# Patient Record
Sex: MALE | Race: WHITE | NOT HISPANIC OR LATINO | URBAN - METROPOLITAN AREA
[De-identification: names, ages, dates, MRNs, and addresses within clinical notes are randomized per-mention and may not be internally consistent; named-entity substitution may affect disease eponyms.]

---

## 2019-01-20 ENCOUNTER — INPATIENT (INPATIENT)
Facility: HOSPITAL | Age: 49
LOS: 1 days | Discharge: HOME | End: 2019-01-22
Attending: SURGERY | Admitting: SURGERY
Payer: COMMERCIAL

## 2019-01-20 VITALS
RESPIRATION RATE: 20 BRPM | HEART RATE: 84 BPM | SYSTOLIC BLOOD PRESSURE: 141 MMHG | OXYGEN SATURATION: 100 % | DIASTOLIC BLOOD PRESSURE: 93 MMHG

## 2019-01-20 LAB
ALBUMIN SERPL ELPH-MCNC: 4.6 G/DL — SIGNIFICANT CHANGE UP (ref 3.5–5.2)
ALP SERPL-CCNC: 68 U/L — SIGNIFICANT CHANGE UP (ref 30–115)
ALT FLD-CCNC: 104 U/L — HIGH (ref 0–41)
ANION GAP SERPL CALC-SCNC: 21 MMOL/L — HIGH (ref 7–14)
APTT BLD: 29.3 SEC — SIGNIFICANT CHANGE UP (ref 27–39.2)
AST SERPL-CCNC: 203 U/L — HIGH (ref 0–41)
BASOPHILS # BLD AUTO: 0.12 K/UL — SIGNIFICANT CHANGE UP (ref 0–0.2)
BASOPHILS NFR BLD AUTO: 0.9 % — SIGNIFICANT CHANGE UP (ref 0–1)
BILIRUB SERPL-MCNC: 0.3 MG/DL — SIGNIFICANT CHANGE UP (ref 0.2–1.2)
BLASTS # FLD: 0.9 % — HIGH (ref 0–0)
BLD GP AB SCN SERPL QL: SIGNIFICANT CHANGE UP
BUN SERPL-MCNC: 9 MG/DL — LOW (ref 10–20)
CALCIUM SERPL-MCNC: 9.1 MG/DL — SIGNIFICANT CHANGE UP (ref 8.5–10.1)
CHLORIDE SERPL-SCNC: 100 MMOL/L — SIGNIFICANT CHANGE UP (ref 98–110)
CO2 SERPL-SCNC: 25 MMOL/L — SIGNIFICANT CHANGE UP (ref 17–32)
CREAT SERPL-MCNC: 1 MG/DL — SIGNIFICANT CHANGE UP (ref 0.7–1.5)
EOSINOPHIL # BLD AUTO: 0.33 K/UL — SIGNIFICANT CHANGE UP (ref 0–0.7)
EOSINOPHIL NFR BLD AUTO: 2.6 % — SIGNIFICANT CHANGE UP (ref 0–8)
ETHANOL SERPL-MCNC: 338 MG/DL — HIGH
GIANT PLATELETS BLD QL SMEAR: PRESENT — SIGNIFICANT CHANGE UP
GLUCOSE SERPL-MCNC: 110 MG/DL — HIGH (ref 70–99)
HCT VFR BLD CALC: 47.4 % — SIGNIFICANT CHANGE UP (ref 42–52)
HGB BLD-MCNC: 15.7 G/DL — SIGNIFICANT CHANGE UP (ref 14–18)
INR BLD: 0.89 RATIO — SIGNIFICANT CHANGE UP (ref 0.65–1.3)
LIDOCAIN IGE QN: 33 U/L — SIGNIFICANT CHANGE UP (ref 7–60)
LYMPHOCYTES # BLD AUTO: 27.4 % — SIGNIFICANT CHANGE UP (ref 20.5–51.1)
LYMPHOCYTES # BLD AUTO: 3.51 K/UL — HIGH (ref 1.2–3.4)
MANUAL SMEAR VERIFICATION: SIGNIFICANT CHANGE UP
MCHC RBC-ENTMCNC: 29.9 PG — SIGNIFICANT CHANGE UP (ref 27–31)
MCHC RBC-ENTMCNC: 33.1 G/DL — SIGNIFICANT CHANGE UP (ref 32–37)
MCV RBC AUTO: 90.3 FL — SIGNIFICANT CHANGE UP (ref 80–94)
METAMYELOCYTES # FLD: 0.9 % — HIGH (ref 0–0)
MONOCYTES # BLD AUTO: 0.68 K/UL — HIGH (ref 0.1–0.6)
MONOCYTES NFR BLD AUTO: 5.3 % — SIGNIFICANT CHANGE UP (ref 1.7–9.3)
NEUTROPHILS # BLD AUTO: 6.24 K/UL — SIGNIFICANT CHANGE UP (ref 1.4–6.5)
NEUTROPHILS NFR BLD AUTO: 48.7 % — SIGNIFICANT CHANGE UP (ref 42.2–75.2)
NRBC # BLD: 0 /100 WBCS — SIGNIFICANT CHANGE UP (ref 0–0)
PLAT MORPH BLD: NORMAL — SIGNIFICANT CHANGE UP
PLATELET # BLD AUTO: 217 K/UL — SIGNIFICANT CHANGE UP (ref 130–400)
POTASSIUM SERPL-MCNC: 3.9 MMOL/L — SIGNIFICANT CHANGE UP (ref 3.5–5)
POTASSIUM SERPL-SCNC: 3.9 MMOL/L — SIGNIFICANT CHANGE UP (ref 3.5–5)
PROT SERPL-MCNC: 7.8 G/DL — SIGNIFICANT CHANGE UP (ref 6–8)
PROTHROM AB SERPL-ACNC: 10.2 SEC — SIGNIFICANT CHANGE UP (ref 9.95–12.87)
RBC # BLD: 5.25 M/UL — SIGNIFICANT CHANGE UP (ref 4.7–6.1)
RBC # FLD: 12.4 % — SIGNIFICANT CHANGE UP (ref 11.5–14.5)
RBC BLD AUTO: NORMAL — SIGNIFICANT CHANGE UP
SODIUM SERPL-SCNC: 146 MMOL/L — SIGNIFICANT CHANGE UP (ref 135–146)
TYPE + AB SCN PNL BLD: SIGNIFICANT CHANGE UP
VARIANT LYMPHS # BLD: 13.3 % — HIGH (ref 0–5)
WBC # BLD: 12.82 K/UL — HIGH (ref 4.8–10.8)
WBC # FLD AUTO: 12.82 K/UL — HIGH (ref 4.8–10.8)

## 2019-01-20 RX ORDER — SODIUM CHLORIDE 9 MG/ML
2000 INJECTION, SOLUTION INTRAVENOUS ONCE
Qty: 0 | Refills: 0 | Status: COMPLETED | OUTPATIENT
Start: 2019-01-20 | End: 2019-01-20

## 2019-01-20 RX ORDER — TETANUS TOXOID, REDUCED DIPHTHERIA TOXOID AND ACELLULAR PERTUSSIS VACCINE, ADSORBED 5; 2.5; 8; 8; 2.5 [IU]/.5ML; [IU]/.5ML; UG/.5ML; UG/.5ML; UG/.5ML
0.5 SUSPENSION INTRAMUSCULAR ONCE
Qty: 0 | Refills: 0 | Status: COMPLETED | OUTPATIENT
Start: 2019-01-20 | End: 2019-01-20

## 2019-01-20 RX ORDER — THIAMINE MONONITRATE (VIT B1) 100 MG
100 TABLET ORAL ONCE
Qty: 0 | Refills: 0 | Status: COMPLETED | OUTPATIENT
Start: 2019-01-20 | End: 2019-01-20

## 2019-01-20 RX ADMIN — SODIUM CHLORIDE 1000 MILLILITER(S): 9 INJECTION, SOLUTION INTRAVENOUS at 22:45

## 2019-01-20 NOTE — ED PROVIDER NOTE - NS ED ATTENDING STATEMENT MOD
I have personally seen and examined this patient.  I have fully participated in the care of this patient. I have reviewed all pertinent clinical information, including history, physical exam, plan and the Resident’s note and agree except as noted. yes

## 2019-01-20 NOTE — ED ADULT NURSE NOTE - OBJECTIVE STATEMENT
49 y/o male BIBEMS after being found at the bottom of stairs with head trauma. GCS 14, unknown LOC, +AOB, and lac to R temple

## 2019-01-20 NOTE — ED PROVIDER NOTE - SECONDARY DIAGNOSIS.
Multiple rib fractures Fall (on) (from) unspecified stairs and steps, initial encounter Alcohol intoxication

## 2019-01-20 NOTE — ED PROVIDER NOTE - ATTENDING CONTRIBUTION TO CARE
intxociated patient not on AC who fell down 7 steps while intoxicated unclear if LOC but there is head injury. on exam primary survey itnact, 2nd survey shows left thigh bruising but non tender, right lateral orbit lac, no spinal tendnerss, soft abd, chest nml, ext nml. imp: head injury while intoxicated, will get cxr, pelvis, ct c spine/head and max facial, suture lac. update tdap.

## 2019-01-20 NOTE — ED PROVIDER NOTE - PHYSICAL EXAMINATION
GEN: In no apparent distress. Intoxicated.    HEAD:  Normocephalic, atraumatic.    EYES:  Clear conjunctivae without injection, drainage or discharge.    ENMT:  Dry MM.    NECK:  Supple, no masses. Normal ROM.    CARDIAC:  RRR, normal S1 and S2, no murmurs, rubs or gallops.    RESP:  Respiratory rate and effort appear normal; lungs are clear to auscultation bilaterally; no rhonchi, rales or wheezes.    ABDOMEN:  Soft, non-tender, non-distended, no masses. Normal BS throughout.    MUSCULOSKELETAL: No leg swelling, no calf tenderness. Patient is moving all extremities spontaneously.  NEURO:  AAO x 2 Keeps saying its 1999 or 1799. Motor 5/5. Follows commands. PERRL. (+) small laceration to right outer orbit with swelling around right orbit.    SKIN:  Normal skin color for age and race, well-perfused; warm and dry.

## 2019-01-20 NOTE — ED PROVIDER NOTE - CARE PLAN
Principal Discharge DX:	Subarachnoid bleed  Secondary Diagnosis:	Multiple rib fractures  Secondary Diagnosis:	Fall (on) (from) unspecified stairs and steps, initial encounter  Secondary Diagnosis:	Alcohol intoxication

## 2019-01-20 NOTE — ED PROVIDER NOTE - PROGRESS NOTE DETAILS
Spoke with Andie from subspeciality for hemorrhage will come assess patient As per Dr. Price admit to SICU under Dr. Smith

## 2019-01-20 NOTE — ED ADULT NURSE NOTE - CHIEF COMPLAINT QUOTE
BIBEMS with head trauma s/p being found at bottom of stairs. GCS 14, lac to lateral side of face, +AOB

## 2019-01-20 NOTE — ED PROVIDER NOTE - MEDICAL DECISION MAKING DETAILS
pt evaluated for trauma after intox and fall, found ot have multiple rib fracture and possibel SAH. staretd on keppra, admitted to sicu.

## 2019-01-20 NOTE — ED PROVIDER NOTE - OBJECTIVE STATEMENT
49 yo male with no significant PMHx presents for fall with intoxication. EMS states patient went to bar as per family and drove home and was walking up stairs and fell down 7 stairs with (+) head trauma (-) LOC as witnessed by daughter. Patient is AAO x 2 does not know year and cannot recall any events of tonight. Patient denies fevers, chest pain, SOB, abdominal pain, nausea, vomiting, diarrhea, dizziness, weakness, changes in PO intake, changes in urine output.

## 2019-01-20 NOTE — CONSULT NOTE ADULT - ASSESSMENT
ASSESSMENT: Patient is a 48M s/p fall down 7 stairs, +HT, -LOC, -AC    PLAN:    - panscan  - will follow  -   -

## 2019-01-20 NOTE — CONSULT NOTE ADULT - SUBJECTIVE AND OBJECTIVE BOX
TRAUMA SERVICE  CONSULT NOTE  --------------------------------------------------------------------------------------------    TRAUMA ACTIVATION LEVEL:     MECHANISM OF INJURY:      [X] Blunt  	[] MVC	[X] Fall	[] Pedestrian Struck	[] Motorcycle accident      [] Penetrating  	[] Gun Shot Wound 		[] Stab Wound    GCS: 	E: 4	V: 4	M: 6    Patient is a 48M PMH asthma who presents s/p witnessed fall down 7 steps. Per witnesses, patient had driven home from the bar, attempted to climb the stairs to his house when he fell backwards down 7 stairs as witnessed by his daughter. He is unable to recall the events surrounding his presentation to the ED. He only recalls going to the doctor with his wife then presenting to the ED. He denies history of frequent alcohol use. Patient is clearly still intoxicated; history and ROS is limited accordingly. His only complaint regards his L PIV.    Primary Survey:    A - airway intact  B - bilateral breath sounds and good chest rise  C - initial BP  BP: 141/93 (01-20-19 @ 21:57) *** , HR HR: 84 (01-20-19 @ 21:57), palpable pulses in all extremities  D - GCS 14on arrival  Exposure obtained      Secondary Survey:   General: NAD  HEENT: Normocephalic, R periorbial edema, ecchymosis, small lateral aspect of right orbit laceration, EOMI, PEERLA.  Neck: Soft, midline trachea.  Chest: No chest wall tenderness.   Cardiac: S1, S2, RRR  Respiratory: Bilateral breath sounds, clear and equal bilaterally  Abdomen: Soft, non-distended, non-tender, no rebound, no guarding, no masses palpated  Ext: palp radial b/l UE, b/l DP palp in Lower Extrem, motor and sensory grossly intact in all 4 extremities  Back: no TTP, no palpable runoff/stepoff/deformity    ROS limited d/t patient intoxication    ROS: 10-system review is otherwise negative except HPI above.      PAST MEDICAL & SURGICAL HISTORY:  PTSD (post-traumatic stress disorder), asthma    FAMILY HISTORY:    [x] Family history not pertinent as reviewed with the patient and family    SOCIAL HISTORY:  not pertinent  ALLERGIES: No Known Allergies      HOME MEDICATIONS: albuterol    CURRENT MEDICATIONS  MEDICATIONS (STANDING):   MEDICATIONS (PRN):  --------------------------------------------------------------------------------------------    Vitals:   T(C): --  HR: 84 (01-20-19 @ 21:57) (84 - 84)  BP: 141/93 (01-20-19 @ 21:57) (141/93 - 141/93)  RR: 20 (01-20-19 @ 21:57) (20 - 20)  SpO2: 100% (01-20-19 @ 21:57) (100% - 100%)  CAPILLARY BLOOD GLUCOSE      POCT Blood Glucose.: 110 mg/dL (20 Jan 2019 21:56)    CAPILLARY BLOOD GLUCOSE      POCT Blood Glucose.: 110 mg/dL (20 Jan 2019 21:56)        --------------------------------------------------------------------------------------------    LABS  CBC (01-20 @ 22:00)                              15.7                           12.82<H>  )----------------(  217        --    % Neutrophils, --    % Lymphocytes, ANC: --                                  47.4        --------------------------------------------------------------------------------------------    IMAGING  PENDING    --------------------------------------------------------------------------------------------

## 2019-01-20 NOTE — ED ADULT NURSE NOTE - NSIMPLEMENTINTERV_GEN_ALL_ED
Implemented All Fall with Harm Risk Interventions:  [place] to call system. Call bell, personal items and telephone within reach. Instruct patient to call for assistance. Room bathroom lighting operational. Non-slip footwear when patient is off stretcher. Physically safe environment: no spills, clutter or unnecessary equipment. Stretcher in lowest position, wheels locked, appropriate side rails in place. Provide visual cue, wrist band, yellow gown, etc. Monitor gait and stability. Monitor for mental status changes and reorient to person, place, and time. Review medications for side effects contributing to fall risk. Reinforce activity limits and safety measures with patient and family. Provide visual clues: red socks.

## 2019-01-21 ENCOUNTER — TRANSCRIPTION ENCOUNTER (OUTPATIENT)
Age: 49
End: 2019-01-21

## 2019-01-21 LAB
ALBUMIN SERPL ELPH-MCNC: 4 G/DL — SIGNIFICANT CHANGE UP (ref 3.5–5.2)
ALP SERPL-CCNC: 64 U/L — SIGNIFICANT CHANGE UP (ref 30–115)
ALT FLD-CCNC: 96 U/L — HIGH (ref 0–41)
AMPHET UR-MCNC: NEGATIVE — SIGNIFICANT CHANGE UP
ANION GAP SERPL CALC-SCNC: 19 MMOL/L — HIGH (ref 7–14)
APPEARANCE UR: CLEAR — SIGNIFICANT CHANGE UP
AST SERPL-CCNC: 150 U/L — HIGH (ref 0–41)
BARBITURATES UR SCN-MCNC: NEGATIVE — SIGNIFICANT CHANGE UP
BASOPHILS # BLD AUTO: 0.03 K/UL — SIGNIFICANT CHANGE UP (ref 0–0.2)
BASOPHILS NFR BLD AUTO: 0.2 % — SIGNIFICANT CHANGE UP (ref 0–1)
BENZODIAZ UR-MCNC: NEGATIVE — SIGNIFICANT CHANGE UP
BILIRUB DIRECT SERPL-MCNC: <0.2 MG/DL — SIGNIFICANT CHANGE UP (ref 0–0.2)
BILIRUB INDIRECT FLD-MCNC: >0.4 MG/DL — SIGNIFICANT CHANGE UP (ref 0.2–1.2)
BILIRUB SERPL-MCNC: 0.6 MG/DL — SIGNIFICANT CHANGE UP (ref 0.2–1.2)
BILIRUB UR-MCNC: NEGATIVE — SIGNIFICANT CHANGE UP
BUN SERPL-MCNC: 6 MG/DL — LOW (ref 10–20)
CALCIUM SERPL-MCNC: 8.4 MG/DL — LOW (ref 8.5–10.1)
CHLORIDE SERPL-SCNC: 103 MMOL/L — SIGNIFICANT CHANGE UP (ref 98–110)
CO2 SERPL-SCNC: 23 MMOL/L — SIGNIFICANT CHANGE UP (ref 17–32)
COCAINE METAB.OTHER UR-MCNC: NEGATIVE — SIGNIFICANT CHANGE UP
COLOR SPEC: YELLOW — SIGNIFICANT CHANGE UP
CREAT SERPL-MCNC: 0.8 MG/DL — SIGNIFICANT CHANGE UP (ref 0.7–1.5)
DIFF PNL FLD: ABNORMAL
EOSINOPHIL # BLD AUTO: 0.01 K/UL — SIGNIFICANT CHANGE UP (ref 0–0.7)
EOSINOPHIL NFR BLD AUTO: 0.1 % — SIGNIFICANT CHANGE UP (ref 0–8)
ETHANOL SERPL-MCNC: 108 MG/DL — HIGH
GLUCOSE SERPL-MCNC: 105 MG/DL — HIGH (ref 70–99)
GLUCOSE UR QL: NEGATIVE MG/DL — SIGNIFICANT CHANGE UP
HCT VFR BLD CALC: 41.8 % — LOW (ref 42–52)
HGB BLD-MCNC: 13.9 G/DL — LOW (ref 14–18)
IMM GRANULOCYTES NFR BLD AUTO: 0.5 % — HIGH (ref 0.1–0.3)
KETONES UR-MCNC: ABNORMAL
LEUKOCYTE ESTERASE UR-ACNC: NEGATIVE — SIGNIFICANT CHANGE UP
LYMPHOCYTES # BLD AUTO: 1.79 K/UL — SIGNIFICANT CHANGE UP (ref 1.2–3.4)
LYMPHOCYTES # BLD AUTO: 12.1 % — LOW (ref 20.5–51.1)
MAGNESIUM SERPL-MCNC: 1.7 MG/DL — LOW (ref 1.8–2.4)
MCHC RBC-ENTMCNC: 30.1 PG — SIGNIFICANT CHANGE UP (ref 27–31)
MCHC RBC-ENTMCNC: 33.3 G/DL — SIGNIFICANT CHANGE UP (ref 32–37)
MCV RBC AUTO: 90.5 FL — SIGNIFICANT CHANGE UP (ref 80–94)
METHADONE UR-MCNC: NEGATIVE — SIGNIFICANT CHANGE UP
MONOCYTES # BLD AUTO: 0.84 K/UL — HIGH (ref 0.1–0.6)
MONOCYTES NFR BLD AUTO: 5.7 % — SIGNIFICANT CHANGE UP (ref 1.7–9.3)
NEUTROPHILS # BLD AUTO: 12.01 K/UL — HIGH (ref 1.4–6.5)
NEUTROPHILS NFR BLD AUTO: 81.4 % — HIGH (ref 42.2–75.2)
NITRITE UR-MCNC: NEGATIVE — SIGNIFICANT CHANGE UP
NRBC # BLD: 0 /100 WBCS — SIGNIFICANT CHANGE UP (ref 0–0)
OPIATES UR-MCNC: NEGATIVE — SIGNIFICANT CHANGE UP
PCP SPEC-MCNC: SIGNIFICANT CHANGE UP
PH UR: 6 — SIGNIFICANT CHANGE UP (ref 5–8)
PHOSPHATE SERPL-MCNC: 3.9 MG/DL — SIGNIFICANT CHANGE UP (ref 2.1–4.9)
PLATELET # BLD AUTO: 180 K/UL — SIGNIFICANT CHANGE UP (ref 130–400)
POTASSIUM SERPL-MCNC: 4.2 MMOL/L — SIGNIFICANT CHANGE UP (ref 3.5–5)
POTASSIUM SERPL-SCNC: 4.2 MMOL/L — SIGNIFICANT CHANGE UP (ref 3.5–5)
PROPOXYPHENE QUALITATIVE URINE RESULT: NEGATIVE — SIGNIFICANT CHANGE UP
PROT SERPL-MCNC: 6.8 G/DL — SIGNIFICANT CHANGE UP (ref 6–8)
PROT UR-MCNC: 30 MG/DL
RBC # BLD: 4.62 M/UL — LOW (ref 4.7–6.1)
RBC # FLD: 12.4 % — SIGNIFICANT CHANGE UP (ref 11.5–14.5)
RBC CASTS # UR COMP ASSIST: SIGNIFICANT CHANGE UP /HPF
SODIUM SERPL-SCNC: 145 MMOL/L — SIGNIFICANT CHANGE UP (ref 135–146)
SP GR SPEC: 1.02 — SIGNIFICANT CHANGE UP (ref 1.01–1.03)
UROBILINOGEN FLD QL: 0.2 MG/DL — SIGNIFICANT CHANGE UP (ref 0.2–0.2)
WBC # BLD: 14.76 K/UL — HIGH (ref 4.8–10.8)
WBC # FLD AUTO: 14.76 K/UL — HIGH (ref 4.8–10.8)

## 2019-01-21 PROCEDURE — 99223 1ST HOSP IP/OBS HIGH 75: CPT

## 2019-01-21 PROCEDURE — 99291 CRITICAL CARE FIRST HOUR: CPT

## 2019-01-21 RX ORDER — OXYCODONE HYDROCHLORIDE 5 MG/1
5 TABLET ORAL EVERY 6 HOURS
Qty: 0 | Refills: 0 | Status: DISCONTINUED | OUTPATIENT
Start: 2019-01-21 | End: 2019-01-22

## 2019-01-21 RX ORDER — IPRATROPIUM/ALBUTEROL SULFATE 18-103MCG
3 AEROSOL WITH ADAPTER (GRAM) INHALATION EVERY 6 HOURS
Qty: 0 | Refills: 0 | Status: DISCONTINUED | OUTPATIENT
Start: 2019-01-21 | End: 2019-01-22

## 2019-01-21 RX ORDER — LEVETIRACETAM 250 MG/1
1000 TABLET, FILM COATED ORAL ONCE
Qty: 0 | Refills: 0 | Status: COMPLETED | OUTPATIENT
Start: 2019-01-21 | End: 2019-01-21

## 2019-01-21 RX ORDER — SODIUM CHLORIDE 9 MG/ML
1000 INJECTION INTRAMUSCULAR; INTRAVENOUS; SUBCUTANEOUS
Qty: 0 | Refills: 0 | Status: DISCONTINUED | OUTPATIENT
Start: 2019-01-21 | End: 2019-01-21

## 2019-01-21 RX ORDER — IBUPROFEN 200 MG
600 TABLET ORAL EVERY 8 HOURS
Qty: 0 | Refills: 0 | Status: DISCONTINUED | OUTPATIENT
Start: 2019-01-21 | End: 2019-01-21

## 2019-01-21 RX ORDER — INSULIN GLARGINE 100 [IU]/ML
10 INJECTION, SOLUTION SUBCUTANEOUS ONCE
Qty: 0 | Refills: 0 | Status: DISCONTINUED | OUTPATIENT
Start: 2019-01-21 | End: 2019-01-21

## 2019-01-21 RX ORDER — FOLIC ACID 0.8 MG
1 TABLET ORAL DAILY
Qty: 0 | Refills: 0 | Status: DISCONTINUED | OUTPATIENT
Start: 2019-01-21 | End: 2019-01-22

## 2019-01-21 RX ORDER — PANTOPRAZOLE SODIUM 20 MG/1
40 TABLET, DELAYED RELEASE ORAL DAILY
Qty: 0 | Refills: 0 | Status: DISCONTINUED | OUTPATIENT
Start: 2019-01-21 | End: 2019-01-22

## 2019-01-21 RX ORDER — PANTOPRAZOLE SODIUM 20 MG/1
40 TABLET, DELAYED RELEASE ORAL DAILY
Qty: 0 | Refills: 0 | Status: DISCONTINUED | OUTPATIENT
Start: 2019-01-21 | End: 2019-01-21

## 2019-01-21 RX ORDER — ACETAMINOPHEN 500 MG
650 TABLET ORAL EVERY 6 HOURS
Qty: 0 | Refills: 0 | Status: DISCONTINUED | OUTPATIENT
Start: 2019-01-21 | End: 2019-01-22

## 2019-01-21 RX ORDER — MAGNESIUM SULFATE 500 MG/ML
2 VIAL (ML) INJECTION ONCE
Qty: 0 | Refills: 0 | Status: COMPLETED | OUTPATIENT
Start: 2019-01-21 | End: 2019-01-21

## 2019-01-21 RX ORDER — ONDANSETRON 8 MG/1
4 TABLET, FILM COATED ORAL EVERY 6 HOURS
Qty: 0 | Refills: 0 | Status: DISCONTINUED | OUTPATIENT
Start: 2019-01-21 | End: 2019-01-22

## 2019-01-21 RX ORDER — FOLIC ACID 0.8 MG
1 TABLET ORAL DAILY
Qty: 0 | Refills: 0 | Status: DISCONTINUED | OUTPATIENT
Start: 2019-01-21 | End: 2019-01-21

## 2019-01-21 RX ORDER — LEVETIRACETAM 250 MG/1
500 TABLET, FILM COATED ORAL EVERY 12 HOURS
Qty: 0 | Refills: 0 | Status: DISCONTINUED | OUTPATIENT
Start: 2019-01-21 | End: 2019-01-22

## 2019-01-21 RX ORDER — HEPARIN SODIUM 5000 [USP'U]/ML
5000 INJECTION INTRAVENOUS; SUBCUTANEOUS EVERY 8 HOURS
Qty: 0 | Refills: 0 | Status: DISCONTINUED | OUTPATIENT
Start: 2019-01-21 | End: 2019-01-22

## 2019-01-21 RX ORDER — SENNA PLUS 8.6 MG/1
1 TABLET ORAL ONCE
Qty: 0 | Refills: 0 | Status: COMPLETED | OUTPATIENT
Start: 2019-01-21 | End: 2019-01-21

## 2019-01-21 RX ORDER — MORPHINE SULFATE 50 MG/1
2 CAPSULE, EXTENDED RELEASE ORAL EVERY 4 HOURS
Qty: 0 | Refills: 0 | Status: DISCONTINUED | OUTPATIENT
Start: 2019-01-21 | End: 2019-01-21

## 2019-01-21 RX ORDER — CHLORHEXIDINE GLUCONATE 213 G/1000ML
1 SOLUTION TOPICAL
Qty: 0 | Refills: 0 | Status: DISCONTINUED | OUTPATIENT
Start: 2019-01-21 | End: 2019-01-22

## 2019-01-21 RX ORDER — DOCUSATE SODIUM 100 MG
100 CAPSULE ORAL
Qty: 0 | Refills: 0 | Status: DISCONTINUED | OUTPATIENT
Start: 2019-01-21 | End: 2019-01-22

## 2019-01-21 RX ORDER — PANTOPRAZOLE SODIUM 20 MG/1
40 TABLET, DELAYED RELEASE ORAL
Qty: 0 | Refills: 0 | Status: DISCONTINUED | OUTPATIENT
Start: 2019-01-21 | End: 2019-01-21

## 2019-01-21 RX ORDER — ACETAMINOPHEN 500 MG
650 TABLET ORAL EVERY 6 HOURS
Qty: 0 | Refills: 0 | Status: DISCONTINUED | OUTPATIENT
Start: 2019-01-21 | End: 2019-01-21

## 2019-01-21 RX ADMIN — HEPARIN SODIUM 5000 UNIT(S): 5000 INJECTION INTRAVENOUS; SUBCUTANEOUS at 14:20

## 2019-01-21 RX ADMIN — SODIUM CHLORIDE 100 MILLILITER(S): 9 INJECTION INTRAMUSCULAR; INTRAVENOUS; SUBCUTANEOUS at 02:09

## 2019-01-21 RX ADMIN — OXYCODONE HYDROCHLORIDE 5 MILLIGRAM(S): 5 TABLET ORAL at 22:17

## 2019-01-21 RX ADMIN — SODIUM CHLORIDE 100 MILLILITER(S): 9 INJECTION INTRAMUSCULAR; INTRAVENOUS; SUBCUTANEOUS at 07:25

## 2019-01-21 RX ADMIN — Medication 100 MILLIGRAM(S): at 02:33

## 2019-01-21 RX ADMIN — OXYCODONE HYDROCHLORIDE 5 MILLIGRAM(S): 5 TABLET ORAL at 21:38

## 2019-01-21 RX ADMIN — Medication 1 MILLIGRAM(S): at 14:20

## 2019-01-21 RX ADMIN — Medication 600 MILLIGRAM(S): at 06:16

## 2019-01-21 RX ADMIN — Medication 3 MILLILITER(S): at 20:57

## 2019-01-21 RX ADMIN — LEVETIRACETAM 420 MILLIGRAM(S): 250 TABLET, FILM COATED ORAL at 17:06

## 2019-01-21 RX ADMIN — PANTOPRAZOLE SODIUM 40 MILLIGRAM(S): 20 TABLET, DELAYED RELEASE ORAL at 17:06

## 2019-01-21 RX ADMIN — Medication 650 MILLIGRAM(S): at 06:15

## 2019-01-21 RX ADMIN — LEVETIRACETAM 400 MILLIGRAM(S): 250 TABLET, FILM COATED ORAL at 02:33

## 2019-01-21 RX ADMIN — Medication 100 MILLIGRAM(S): at 17:06

## 2019-01-21 RX ADMIN — Medication 650 MILLIGRAM(S): at 17:07

## 2019-01-21 RX ADMIN — Medication 50 GRAM(S): at 09:25

## 2019-01-21 RX ADMIN — Medication 650 MILLIGRAM(S): at 17:06

## 2019-01-21 RX ADMIN — SENNA PLUS 1 TABLET(S): 8.6 TABLET ORAL at 21:49

## 2019-01-21 RX ADMIN — Medication 650 MILLIGRAM(S): at 14:19

## 2019-01-21 RX ADMIN — HEPARIN SODIUM 5000 UNIT(S): 5000 INJECTION INTRAVENOUS; SUBCUTANEOUS at 21:39

## 2019-01-21 NOTE — DISCHARGE NOTE ADULT - ADDITIONAL INSTRUCTIONS
Medication (Keppra) has been sent to your pharmacy. Please take as directed for 7 days.     Call to schedule a follow-up appointment with the neurosurgeon - Dr. Metz - in 7 days. [phone]    If you experience fevers >101, altered mental status, worsening pain that cannot be relieved with Tylenol/Ibuprofen, or increased shortness of breath, we recommend you return to the ER for evaluation.  Please continue seeing your primary care physician for routine checkups.

## 2019-01-21 NOTE — CONSULT NOTE ADULT - ATTENDING COMMENTS
head trauma with SAH  multiple ribs fracture   repeat head CT stable   ETOH on Board   transfer to floor.   pain control   Ciwa

## 2019-01-21 NOTE — CONSULT NOTE ADULT - CONSULT REASON
s/p fall down 7 stairs, +HT, -LOC, -AC, Acute fractures to right ribs #6, #7 #9, #10, small focus of subarachnoid hemorrhage

## 2019-01-21 NOTE — DISCHARGE NOTE ADULT - PLAN OF CARE
Complete recovery Medication (Keppra) has been sent to your pharmacy. Please take as directed for 7 days.     Call to schedule a follow-up appointment with the neurosurgeon - Dr. Metz - in 7 days. [phone]    If you experience fevers >101, altered mental status, worsening pain that cannot be relieved with Tylenol/Ibuprofen, or increased shortness of breath, we recommend you return to the ER for evaluation.  Please continue seeing your primary care physician for routine checkups.

## 2019-01-21 NOTE — DISCHARGE NOTE ADULT - MEDICATION SUMMARY - MEDICATIONS TO TAKE
I will START or STAY ON the medications listed below when I get home from the hospital:    acetaminophen 325 mg oral tablet  -- 2 tab(s) by mouth every 6 hours  -- Indication: For Pain    oxyCODONE 5 mg oral tablet  -- 1 tab(s) by mouth every 6 hours, As Needed -for moderate pain MDD:MDD=4   -- Caution federal law prohibits the transfer of this drug to any person other  than the person for whom it was prescribed.  It is very important that you take or use this exactly as directed.  Do not skip doses or discontinue unless directed by your doctor.  May cause drowsiness.  Alcohol may intensify this effect.  Use care when operating dangerous machinery.  This prescription cannot be refilled.  Using more of this medication than prescribed may cause serious breathing problems.    -- Indication: For Pain    levETIRAcetam 500 mg oral tablet  -- 1 tab(s) by mouth 2 times a day   -- Check with your doctor before becoming pregnant.  It is very important that you take or use this exactly as directed.  Do not skip doses or discontinue unless directed by your doctor.  May cause drowsiness or dizziness.  Obtain medical advice before taking any non-prescription drugs as some may affect the action of this medication.  Swallow whole.  Do not crush.  This drug may impair the ability to drive or operate machinery.  Use care until you become familiar with its effects.    -- Indication: For Seizure ppx    Wellbutrin  -- Indication: For home med

## 2019-01-21 NOTE — H&P ADULT - PMH
Asthma, unspecified asthma severity, unspecified whether complicated, unspecified whether persistent    PTSD (post-traumatic stress disorder)

## 2019-01-21 NOTE — CHART NOTE - NSCHARTNOTEFT_GEN_A_CORE
ASSESSMENT:  48M s/p fall down 7 stairs, +HT, -LOC, -AC, + EToH, w/ Acute fractures to right ribs #6, #7 #9, #10, small focus of subarachnoid hemorrhage    PLAN:    NEURO: No dx, Monitor for changes in mental status, Pain regimen: Tylenol, Motrin,, Oxycodone.   Neuro checks:q1h , Seizure ppx: Keppra 500mg BID  Significant imaging: CTH small focus of subarachnoid hemorrhage  - NSx following, repeat CTH in am.  - Hx of PTSD, on Wellbutrin  - ETOH, CIWA protocol, thiamine & Folate.    PULM: Acute fractures to right ribs #6, #7 #9, #10. Incentive spirometer pulling 1500cc. RA satting well. Continue to monitor respiratory status.     CARDIO: No dx, normotensive, no tachycardia.     GI: Regular diet started this am. GI ppx with PPI, Pepcid, Bowel regimen: Colace, senna.     RENAL/: No dx, monitor UOP, -Strict I&O, Replete electrolytes PRN    HEME: No dx, Monitor H/H, DVT ppx: SCDs, started SQH today per nsx.     ID: No dx, Monitor for fevers, leukocytosis    Endocrine: No Acute Dx , Monitor FSG.    Signed out to Meche ARVIZU at 1/21/19, @ [id number].

## 2019-01-21 NOTE — CONSULT NOTE ADULT - SUBJECTIVE AND OBJECTIVE BOX
47 yo male, intoxicated, BIBA s/p witnessed fall down 7 steps outside, pt presented with GCS 14, does not recall year appropriately, no LOC. Trauma work up revealed a hyperdensity in the left, high frontoparietal area c/w SAH    PAST MEDICAL & SURGICAL HISTORY:  Asthma, unspecified asthma severity, unspecified whether complicated, unspecified whether persistent  PTSD (post-traumatic stress disorder)  No significant past surgical history    Home Medications:  Wellbutrin:  (20 Jan 2019 22:04)    Allergies    No Known Allergies    Intolerances    SHx - NC    FHx - NC    Vital Signs Last 24 Hrs  T(C): 36.2 (21 Jan 2019 02:43), Max: 36.2 (21 Jan 2019 02:43)  T(F): 97.2 (21 Jan 2019 02:43), Max: 97.2 (21 Jan 2019 02:43)  HR: 87 (21 Jan 2019 03:45) (84 - 98)  BP: 117/67 (21 Jan 2019 03:45) (103/57 - 141/93)  RR: 20 (21 Jan 2019 03:45) (18 - 20)  SpO2: 100% (21 Jan 2019 03:45) (100% - 100%)    pt seen and examined at bedside  a+ox2, GCS 14, nad, follows complex commands  nc/at, PERRL, 4mm, brisk  right eye is swollen and ecchymotic, +dried blood  YING x4, strength equal 5/5 x4, sensation intact x4  FROM x4  cerebellar function intact b/l                          15.7   12.82 )-----------( 217      ( 20 Jan 2019 22:00 )             47.4   < from: CT Head No Cont (01.20.19 @ 23:12) >  IMPRESSION:    Linear high density along the cortical sulci of the high left   frontoparietal lobe is favored to reflect a small focus of subarachnoid   hemorrhage. Short-term follow-up suggested.    < from: CT Maxillofacial No Cont (01.20.19 @ 23:26) >  IMPRESSION:    Right periorbital soft tissue swelling with trace right intraorbital   hemorrhage. No evidence for acute displaced fracture. Globes and lenses   appear intact.    < from: CT Cervical Spine No Cont (01.20.19 @ 23:13) >  IMPRESSION:    No acute cervical spine fracture or subluxation.    < from: CT Angio Abdomen and Pelvis w/ IV Cont (01.20.19 @ 23:26) >  IMPRESSION:     Nondisplaced fractures of the right lateral 8th and 9th ribs.  Mildly displaced fractures of the right anterolateral 6th and 7th ribs.  Mildly displaced fractures of the right posterior 9th and 10th ribs.  No appreciable pneumothorax.  No evidence for solid organ injury.      < end of copied text >

## 2019-01-21 NOTE — DISCHARGE NOTE ADULT - HOSPITAL COURSE
47y/o M, w/ PMH of asthma and PTSD, who presents s/p witnessed fall down 7 steps. Per witnesses, patient had driven home from the bar, attempted to climb the stairs to his house when he fell backwards down 7 stairs as witnessed by his daughter. He is unable to recall the events surrounding his presentation to the ED. He only recalls going to the doctor with his wife earlier today then presenting to the ED. He denies history of frequent alcohol use. Patient was still intoxicated at presentation; history and ROS was limited accordingly. His only complaint regards discomfort at his L PIV.  CT scan showed evidence of fractures of R ribs #6,7,9,10 as well as SAH. Pt was admitted to SICU for q1 neurochecks and hemodynamic stability. Pulling 1500 on incentive. Pt's mental status stable and unchanged. Pt was downgraded to the floor; pain controlled, ambulating without difficulty, tolerating diet.

## 2019-01-21 NOTE — DISCHARGE NOTE ADULT - CARE PLAN
Principal Discharge DX:	Subarachnoid bleed  Goal:	Complete recovery  Assessment and plan of treatment:	Medication (Keppra) has been sent to your pharmacy. Please take as directed for 7 days.     Call to schedule a follow-up appointment with the neurosurgeon - Dr. Metz - in 7 days. [phone]    If you experience fevers >101, altered mental status, worsening pain that cannot be relieved with Tylenol/Ibuprofen, or increased shortness of breath, we recommend you return to the ER for evaluation.  Please continue seeing your primary care physician for routine checkups.  Secondary Diagnosis:	Multiple rib fractures

## 2019-01-21 NOTE — H&P ADULT - NSHPPHYSICALEXAM_GEN_ALL_CORE
PHYSICAL EXAM:    HEENT: Normocephalic, R periorbial edema, ecchymosis, small lateral aspect of right orbit laceration, EOMI, PEERLA.  Neck: Soft, midline trachea.  Chest: No chest wall tenderness.   Cardiac: S1, S2, RRR  Respiratory: Bilateral breath sounds, clear and equal bilaterally  Abdomen: Soft, non-distended, non-tender, no rebound, no guarding, no masses palpated  Ext: palp radial b/l UE, b/l DP palp in Lower Extrem, motor and sensory grossly intact in all 4 extremities  Back: no TTP, no palpable runoff/stepoff/deformity  ROS limited d/t patient intoxication

## 2019-01-21 NOTE — CONSULT NOTE ADULT - ATTENDING COMMENTS
CTH with small amount traumatic SAH. Repeat grossly stable. Pt still confused, ?alcohol intox related. Admit for further obs. Likely d/c tomorrow pending resolution of confusion 9A&Ox2, otherwise no deficit). Keppra x7d.

## 2019-01-21 NOTE — DISCHARGE NOTE ADULT - PATIENT PORTAL LINK FT
You can access the Number [facility] Patient Portal, offered by [facility], by registering with the following website: [email or web address]

## 2019-01-21 NOTE — CONSULT NOTE ADULT - ASSESSMENT
49 yo male, intoxicated, s/p mechanical fall, GCS 14, trauma work up revealed hyperdensity in the left frontoparietal area c/w SAH, pt not on AC    plan  -admit to ICU  -neuro checks q 1 hour  -repeat head CT in 6 hours from original CT head or if acute AMS  -seizure prophylaxis  -will follow    case d/w

## 2019-01-21 NOTE — DISCHARGE NOTE ADULT - CARE PROVIDER_API CALL
Jb Metz (MD), [facility]  [address] [place] [address]  [address] [place] [address]  Phone: [phone]  Fax: [phone]

## 2019-01-21 NOTE — H&P ADULT - NSHPLABSRESULTS_GEN_ALL_CORE
Labs  Labs:  CAPILLARY BLOOD GLUCOSE      POCT Blood Glucose.: 110 mg/dL (20 Jan 2019 21:56)                          15.7   12.82 )-----------( 217      ( 20 Jan 2019 22:00 )             47.4       Auto Neutrophil %: 48.7 % (01-20-19 @ 22:00)    01-20    146  |  100  |  9<L>  ----------------------------<  110<H>  3.9   |  25  |  1.0      Calcium, Total Serum: 9.1 mg/dL (01-20-19 @ 22:00)      LFTs:             7.8  | 0.3  | 203      ------------------[68      ( 20 Jan 2019 22:00 )  4.6  | x    | 104         Lipase:33     Amylase:x             Coags:     10.20  ----< 0.89    ( 20 Jan 2019 22:00 )     29.3          Imaging  < from: CT Angio Abdomen and Pelvis w/ IV Cont (01.20.19 @ 23:26) >    IMPRESSION:     Acute fractures to right ribs #6, #7 #9, #10.    < end of copied text >    < from: CT Maxillofacial No Cont (01.20.19 @ 23:26) >    IMPRESSION:    Soft tissue swelling lateral to the right orbit without underlying   fracture.  < end of copied text >    < from: CT Cervical Spine No Cont (01.20.19 @ 23:13) >    IMPRESSION:    No acute cervical spine fracture or subluxation.    < end of copied text >    < from: CT Head No Cont (01.20.19 @ 23:12) >    IMPRESSION:    Linear high density along the cortical sulci of the high left   frontoparietal lobe is favored to reflect a small focus of subarachnoid   hemorrhage. Short-term follow-up suggested.      < end of copied text >

## 2019-01-21 NOTE — H&P ADULT - HISTORY OF PRESENT ILLNESS
48M PMH asthma who presents s/p witnessed fall down 7 steps. Per witnesses, patient had driven home from the bar, attempted to climb the stairs to his house when he fell backwards down 7 stairs as witnessed by his daughter. He is unable to recall the events surrounding his presentation to the ED. He only recalls going to the doctor with his wife earlier today then presenting to the ED. He denies history of frequent alcohol use. Patient is still intoxicated; history and ROS is limited accordingly. His only complaint regards discomfort at his L PIV. 48M PMH asthma who presents s/p witnessed fall down 7 steps. Per witnesses, patient had driven home from the bar, attempted to climb the stairs to his house when he fell backwards down 7 stairs as witnessed by his daughter. He is unable to recall the events surrounding his presentation to the ED. He only recalls going to the doctor with his wife earlier today then presenting to the ED. He denies history of frequent alcohol use. Patient is still intoxicated; history and ROS is limited accordingly. His only complaint regards discomfort at his L PIV.    Primary Survey:    A - airway intact  B - bilateral breath sounds and good chest rise  C - initial BP  BP: 141/93 (01-20-19 @ 21:57) *** , HR HR: 84 (01-20-19 @ 21:57), palpable pulses in all extremities  D - GCS 14 on arrival  Exposure obtained

## 2019-01-22 VITALS
DIASTOLIC BLOOD PRESSURE: 83 MMHG | SYSTOLIC BLOOD PRESSURE: 128 MMHG | TEMPERATURE: 97 F | RESPIRATION RATE: 19 BRPM | HEART RATE: 95 BPM

## 2019-01-22 LAB
ANION GAP SERPL CALC-SCNC: 16 MMOL/L — HIGH (ref 7–14)
APTT BLD: 29.9 SEC — SIGNIFICANT CHANGE UP (ref 27–39.2)
BUN SERPL-MCNC: 12 MG/DL — SIGNIFICANT CHANGE UP (ref 10–20)
CALCIUM SERPL-MCNC: 8.6 MG/DL — SIGNIFICANT CHANGE UP (ref 8.5–10.1)
CHLORIDE SERPL-SCNC: 99 MMOL/L — SIGNIFICANT CHANGE UP (ref 98–110)
CO2 SERPL-SCNC: 26 MMOL/L — SIGNIFICANT CHANGE UP (ref 17–32)
CREAT SERPL-MCNC: 0.8 MG/DL — SIGNIFICANT CHANGE UP (ref 0.7–1.5)
GLUCOSE SERPL-MCNC: 121 MG/DL — HIGH (ref 70–99)
HCT VFR BLD CALC: 40.2 % — LOW (ref 42–52)
HGB BLD-MCNC: 13.2 G/DL — LOW (ref 14–18)
INR BLD: 0.97 RATIO — SIGNIFICANT CHANGE UP (ref 0.65–1.3)
MAGNESIUM SERPL-MCNC: 2.3 MG/DL — SIGNIFICANT CHANGE UP (ref 1.8–2.4)
MCHC RBC-ENTMCNC: 30 PG — SIGNIFICANT CHANGE UP (ref 27–31)
MCHC RBC-ENTMCNC: 32.8 G/DL — SIGNIFICANT CHANGE UP (ref 32–37)
MCV RBC AUTO: 91.4 FL — SIGNIFICANT CHANGE UP (ref 80–94)
NRBC # BLD: 0 /100 WBCS — SIGNIFICANT CHANGE UP (ref 0–0)
PHOSPHATE SERPL-MCNC: 3.5 MG/DL — SIGNIFICANT CHANGE UP (ref 2.1–4.9)
PLATELET # BLD AUTO: 151 K/UL — SIGNIFICANT CHANGE UP (ref 130–400)
POTASSIUM SERPL-MCNC: 4.1 MMOL/L — SIGNIFICANT CHANGE UP (ref 3.5–5)
POTASSIUM SERPL-SCNC: 4.1 MMOL/L — SIGNIFICANT CHANGE UP (ref 3.5–5)
PROTHROM AB SERPL-ACNC: 11.2 SEC — SIGNIFICANT CHANGE UP (ref 9.95–12.87)
RBC # BLD: 4.4 M/UL — LOW (ref 4.7–6.1)
RBC # FLD: 12.5 % — SIGNIFICANT CHANGE UP (ref 11.5–14.5)
SODIUM SERPL-SCNC: 141 MMOL/L — SIGNIFICANT CHANGE UP (ref 135–146)
WBC # BLD: 10.31 K/UL — SIGNIFICANT CHANGE UP (ref 4.8–10.8)
WBC # FLD AUTO: 10.31 K/UL — SIGNIFICANT CHANGE UP (ref 4.8–10.8)

## 2019-01-22 PROCEDURE — 99232 SBSQ HOSP IP/OBS MODERATE 35: CPT

## 2019-01-22 RX ORDER — KETOROLAC TROMETHAMINE 30 MG/ML
15 SYRINGE (ML) INJECTION ONCE
Qty: 0 | Refills: 0 | Status: DISCONTINUED | OUTPATIENT
Start: 2019-01-22 | End: 2019-01-22

## 2019-01-22 RX ORDER — OXYCODONE HYDROCHLORIDE 5 MG/1
1 TABLET ORAL
Qty: 5 | Refills: 0
Start: 2019-01-22

## 2019-01-22 RX ORDER — LEVETIRACETAM 250 MG/1
1 TABLET, FILM COATED ORAL
Qty: 14 | Refills: 0
Start: 2019-01-22 | End: 2019-01-28

## 2019-01-22 RX ORDER — LEVETIRACETAM 250 MG/1
1 TABLET, FILM COATED ORAL
Qty: 14 | Refills: 0 | OUTPATIENT
Start: 2019-01-22 | End: 2019-01-28

## 2019-01-22 RX ORDER — ACETAMINOPHEN 500 MG
2 TABLET ORAL
Qty: 0 | Refills: 0 | DISCHARGE
Start: 2019-01-22

## 2019-01-22 RX ADMIN — Medication 1 MILLIGRAM(S): at 12:16

## 2019-01-22 RX ADMIN — Medication 15 MILLIGRAM(S): at 12:15

## 2019-01-22 RX ADMIN — HEPARIN SODIUM 5000 UNIT(S): 5000 INJECTION INTRAVENOUS; SUBCUTANEOUS at 13:35

## 2019-01-22 RX ADMIN — LEVETIRACETAM 420 MILLIGRAM(S): 250 TABLET, FILM COATED ORAL at 02:56

## 2019-01-22 RX ADMIN — OXYCODONE HYDROCHLORIDE 5 MILLIGRAM(S): 5 TABLET ORAL at 17:32

## 2019-01-22 RX ADMIN — Medication 650 MILLIGRAM(S): at 01:00

## 2019-01-22 RX ADMIN — HEPARIN SODIUM 5000 UNIT(S): 5000 INJECTION INTRAVENOUS; SUBCUTANEOUS at 05:02

## 2019-01-22 RX ADMIN — OXYCODONE HYDROCHLORIDE 5 MILLIGRAM(S): 5 TABLET ORAL at 05:00

## 2019-01-22 RX ADMIN — PANTOPRAZOLE SODIUM 40 MILLIGRAM(S): 20 TABLET, DELAYED RELEASE ORAL at 12:16

## 2019-01-22 RX ADMIN — Medication 650 MILLIGRAM(S): at 12:16

## 2019-01-22 RX ADMIN — OXYCODONE HYDROCHLORIDE 5 MILLIGRAM(S): 5 TABLET ORAL at 16:30

## 2019-01-22 RX ADMIN — Medication 650 MILLIGRAM(S): at 05:02

## 2019-01-22 RX ADMIN — Medication 100 MILLIGRAM(S): at 05:02

## 2019-01-22 RX ADMIN — LEVETIRACETAM 420 MILLIGRAM(S): 250 TABLET, FILM COATED ORAL at 13:35

## 2019-01-22 RX ADMIN — Medication 15 MILLIGRAM(S): at 12:30

## 2019-01-22 NOTE — PROGRESS NOTE ADULT - SUBJECTIVE AND OBJECTIVE BOX
GENERAL SURGERY PROGRESS NOTE     KAYE WYATT  48y  Male  Hospital day :1d    OVERNIGHT EVENTS:  No acute events overnight. Pt pain is controlled. Able to pull 1,000 on IS. Tolerating PO diet.   T(F): 97.8 (19 @ 00:00), Max: 98.8 (19 @ 08:00)  HR: 85 (19 @ 00:00) (85 - 98)  BP: 133/77 (19 @ 00:00) (90/49 - 133/77)  RR: 17 (19 @ 00:00) (17 - 20)  SpO2: 97% (19 @ 10:00) (97% - 100%)    DIET/FLUIDS: folic acid 1 milliGRAM(s) Oral daily    GI proph:  pantoprazole    Tablet 40 milliGRAM(s) Oral daily    AC/ proph: heparin  Injectable 5000 Unit(s) SubCutaneous every 8 hours    ABx:     PHYSICAL EXAM:  GENERAL: NAD, well-appearing  CHEST/LUNG: no obvious increased wob   ABDOMEN: Soft, Nontender, Nondistended;   EXTREMITIES:  No clubbing, cyanosis, or edema      LABS  Labs:  CAPILLARY BLOOD GLUCOSE                              13.2   10.31 )-----------( 151      ( 2019 01:30 )             40.2       Auto Immature Granulocyte %: 0.5 % (19 @ 07:04)  Auto Neutrophil %: 81.4 % (19 @ 07:04)        141  |  99  |  12  ----------------------------<  121<H>  4.1   |  26  |  0.8      Calcium, Total Serum: 8.6 mg/dL (19 @ 01:30)      LFTs:             6.8  | 0.6  | 150      ------------------[64      ( 2019 07:04 )  4.0  | <0.2 | 96          Lipase:x      Amylase:x             Coags:     11.20  ----< 0.97    ( 2019 01:30 )     29.9              Alcohol, Blood: 108 mg/dL (19 @ 07:04)    Urinalysis Basic - ( 2019 02:50 )    Color: Yellow / Appearance: Clear / S.025 / pH: x  Gluc: x / Ketone: Trace  / Bili: Negative / Urobili: 0.2 mg/dL   Blood: x / Protein: 30 mg/dL / Nitrite: Negative   Leuk Esterase: Negative / RBC: 1-2 /HPF / WBC x   Sq Epi: x / Non Sq Epi: x / Bacteria: x

## 2019-01-22 NOTE — PROGRESS NOTE ADULT - ASSESSMENT
s/p fall with R rib fx 6, 7, 9, 10 and L SAH    PLAN:    - Pt doing well, aaox3, tolerating diet, pain controlled    - per neurosurgery no surgical intervention. If mentating well today may d/c on 7 days of kera    - dispo: home

## 2019-01-25 DIAGNOSIS — H05.221 EDEMA OF RIGHT ORBIT: ICD-10-CM

## 2019-01-25 DIAGNOSIS — I60.9 NONTRAUMATIC SUBARACHNOID HEMORRHAGE, UNSPECIFIED: ICD-10-CM

## 2019-01-25 DIAGNOSIS — S05.41XA PENETRATING WOUND OF ORBIT WITH OR WITHOUT FOREIGN BODY, RIGHT EYE, INITIAL ENCOUNTER: ICD-10-CM
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
## 2019-01-25 DIAGNOSIS — F10.129 ALCOHOL ABUSE WITH INTOXICATION, UNSPECIFIED: ICD-10-CM

## 2019-01-25 DIAGNOSIS — S22.49XA MULTIPLE FRACTURES OF RIBS, UNSPECIFIED SIDE, INITIAL ENCOUNTER FOR CLOSED FRACTURE: ICD-10-CM
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
## 2019-01-25 DIAGNOSIS — J45.909 UNSPECIFIED ASTHMA, UNCOMPLICATED: ICD-10-CM
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
## 2019-01-25 DIAGNOSIS — S06.6X9A TRAUMATIC SUBARACHNOID HEMORRHAGE WITH LOSS OF CONSCIOUSNESS OF UNSPECIFIED DURATION, INITIAL ENCOUNTER: ICD-10-CM

## 2019-01-25 DIAGNOSIS — F43.10 POST-TRAUMATIC STRESS DISORDER, UNSPECIFIED: ICD-10-CM

## 2019-01-28 PROBLEM — Z00.00 ENCOUNTER FOR PREVENTIVE HEALTH EXAMINATION: Status: ACTIVE | Noted: 2019-01-28

## 2019-02-13 ENCOUNTER — APPOINTMENT (OUTPATIENT)
Dept: NEUROSURGERY | Facility: CLINIC | Age: 49
End: 2019-02-13
Payer: COMMERCIAL

## 2019-02-13 DIAGNOSIS — Z86.59 PERSONAL HISTORY OF OTHER MENTAL AND BEHAVIORAL DISORDERS: ICD-10-CM

## 2019-02-13 DIAGNOSIS — S06.6X1D TRAUMATIC SUBARACHNOID HEMORRHAGE WITH LOSS OF CONSCIOUSNESS OF 30 MINUTES OR LESS, SUBSEQUENT ENCOUNTER: ICD-10-CM

## 2019-02-13 DIAGNOSIS — Z87.09 PERSONAL HISTORY OF OTHER DISEASES OF THE RESPIRATORY SYSTEM: ICD-10-CM

## 2019-02-13 PROCEDURE — 99214 OFFICE O/P EST MOD 30 MIN: CPT

## 2019-02-13 RX ORDER — ARIPIPRAZOLE 10 MG/1
10 TABLET ORAL
Qty: 90 | Refills: 0 | Status: ACTIVE | COMMUNITY
Start: 2019-02-13

## 2019-02-14 PROBLEM — S06.6X1D TRAUMATIC SUBARACHNOID HEMORRHAGE WITH LOSS OF CONSCIOUSNESS OF 30 MINUTES OR LESS, SUBSEQUENT ENCOUNTER: Status: ACTIVE | Noted: 2019-02-14

## 2019-02-14 NOTE — HISTORY OF PRESENT ILLNESS
[FreeTextEntry1] : This is a 48 yrs old male who returns today after being discharged from [facility] on January 22, 2019. On January 20, 2019, patient was climbing the stairs in his home, when he suddenly fell backwards. Patient was noted to be intoxicated at the time of admission. CT scan of the head on January 21, 2019 showed trace subarachnoid blood. \par Today, patient states he is doing better. Denies headache, nausea, vomiting, photophobia, photophonia, and visual disturbances.

## 2019-02-14 NOTE — PHYSICAL EXAM
[FreeTextEntry1] : Constitutional: Well appearing, no distress\par HEENT: Normocephalic Atraumatic\par Psychiatric: Alert and oriented to all spheres, normal mood\par Pulmonary: no respiratory distress\par Abdomen: non-distended\par Vascular/Extremities: no edema, no cyanosis, no clubbing\par \par \par Neurologic: \par CN II-XII grossly intact\par ROM: Full in cervical and lumbar spine\par Palpation: no pain to palpation in cervical spine, no pain to palpation in lumbar spine\par Strength: Full strength in all major muscle groups, no atrophy\par Sensation: Full sensation to light touch in all extremities\par Reflexes: \par               2+ patellar\par               2+ biceps\par               2+ ankle jerk\par              No Olivas's\par              No clonus\par              No babinski\par \par Signs:\par negative rhomberg \par \par Gait:fluid\par \par \par \par \par

## 2019-09-02 PROBLEM — Z86.59 HISTORY OF POST TRAUMATIC STRESS DISORDER: Status: RESOLVED | Noted: 2019-02-13 | Resolved: 2019-09-02
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
## 2020-02-03 ENCOUNTER — APPOINTMENT (OUTPATIENT)
Dept: NEUROLOGY | Facility: CLINIC | Age: 50
End: 2020-02-03

## 2020-03-17 ENCOUNTER — APPOINTMENT (OUTPATIENT)
Dept: NEUROLOGY | Facility: CLINIC | Age: 50
End: 2020-03-17

## 2020-06-11 ENCOUNTER — INPATIENT (INPATIENT)
Facility: HOSPITAL | Age: 50
LOS: 6 days | Discharge: HOME | End: 2020-06-18
Attending: PSYCHIATRY & NEUROLOGY | Admitting: PSYCHIATRY & NEUROLOGY
Payer: MEDICARE

## 2020-06-11 VITALS
HEART RATE: 100 BPM | DIASTOLIC BLOOD PRESSURE: 115 MMHG | WEIGHT: 167.99 LBS | SYSTOLIC BLOOD PRESSURE: 155 MMHG | TEMPERATURE: 98 F | OXYGEN SATURATION: 97 % | RESPIRATION RATE: 20 BRPM | HEIGHT: 70 IN

## 2020-06-11 LAB
ALBUMIN SERPL ELPH-MCNC: 5 G/DL — SIGNIFICANT CHANGE UP (ref 3.5–5.2)
ALP SERPL-CCNC: 68 U/L — SIGNIFICANT CHANGE UP (ref 30–115)
ALT FLD-CCNC: 58 U/L — HIGH (ref 0–41)
AMMONIA BLD-MCNC: 25 UMOL/L — SIGNIFICANT CHANGE UP (ref 11–55)
AMPHET UR-MCNC: NEGATIVE — SIGNIFICANT CHANGE UP
ANION GAP SERPL CALC-SCNC: 14 MMOL/L — SIGNIFICANT CHANGE UP (ref 7–14)
APAP SERPL-MCNC: <5 UG/ML — LOW (ref 10–30)
APPEARANCE UR: CLEAR — SIGNIFICANT CHANGE UP
APTT BLD: 31 SEC — SIGNIFICANT CHANGE UP (ref 27–39.2)
AST SERPL-CCNC: 80 U/L — HIGH (ref 0–41)
BACTERIA # UR AUTO: ABNORMAL
BARBITURATES UR SCN-MCNC: NEGATIVE — SIGNIFICANT CHANGE UP
BASOPHILS # BLD AUTO: 0.03 K/UL — SIGNIFICANT CHANGE UP (ref 0–0.2)
BASOPHILS NFR BLD AUTO: 0.6 % — SIGNIFICANT CHANGE UP (ref 0–1)
BENZODIAZ UR-MCNC: NEGATIVE — SIGNIFICANT CHANGE UP
BILIRUB SERPL-MCNC: 0.4 MG/DL — SIGNIFICANT CHANGE UP (ref 0.2–1.2)
BILIRUB UR-MCNC: NEGATIVE — SIGNIFICANT CHANGE UP
BUN SERPL-MCNC: 11 MG/DL — SIGNIFICANT CHANGE UP (ref 10–20)
CALCIUM SERPL-MCNC: 9 MG/DL — SIGNIFICANT CHANGE UP (ref 8.5–10.1)
CHLORIDE SERPL-SCNC: 101 MMOL/L — SIGNIFICANT CHANGE UP (ref 98–110)
CO2 SERPL-SCNC: 24 MMOL/L — SIGNIFICANT CHANGE UP (ref 17–32)
COCAINE METAB.OTHER UR-MCNC: NEGATIVE — SIGNIFICANT CHANGE UP
COLOR SPEC: YELLOW — SIGNIFICANT CHANGE UP
COMMENT - URINE: SIGNIFICANT CHANGE UP
CREAT SERPL-MCNC: 0.7 MG/DL — SIGNIFICANT CHANGE UP (ref 0.7–1.5)
DIFF PNL FLD: ABNORMAL
DRUG SCREEN 1, URINE RESULT: SIGNIFICANT CHANGE UP
EOSINOPHIL # BLD AUTO: 0.15 K/UL — SIGNIFICANT CHANGE UP (ref 0–0.7)
EOSINOPHIL NFR BLD AUTO: 2.8 % — SIGNIFICANT CHANGE UP (ref 0–8)
EPI CELLS # UR: ABNORMAL /HPF
ETHANOL SERPL-MCNC: 332 MG/DL — HIGH
GLUCOSE SERPL-MCNC: 110 MG/DL — HIGH (ref 70–99)
GLUCOSE UR QL: NEGATIVE MG/DL — SIGNIFICANT CHANGE UP
HCT VFR BLD CALC: 43.4 % — SIGNIFICANT CHANGE UP (ref 42–52)
HGB BLD-MCNC: 15 G/DL — SIGNIFICANT CHANGE UP (ref 14–18)
IMM GRANULOCYTES NFR BLD AUTO: 0.2 % — SIGNIFICANT CHANGE UP (ref 0.1–0.3)
INR BLD: 0.96 RATIO — SIGNIFICANT CHANGE UP (ref 0.65–1.3)
KETONES UR-MCNC: NEGATIVE — SIGNIFICANT CHANGE UP
LEUKOCYTE ESTERASE UR-ACNC: NEGATIVE — SIGNIFICANT CHANGE UP
LYMPHOCYTES # BLD AUTO: 2.37 K/UL — SIGNIFICANT CHANGE UP (ref 1.2–3.4)
LYMPHOCYTES # BLD AUTO: 43.7 % — SIGNIFICANT CHANGE UP (ref 20.5–51.1)
MAGNESIUM SERPL-MCNC: 1.9 MG/DL — SIGNIFICANT CHANGE UP (ref 1.8–2.4)
MCHC RBC-ENTMCNC: 31.7 PG — HIGH (ref 27–31)
MCHC RBC-ENTMCNC: 34.6 G/DL — SIGNIFICANT CHANGE UP (ref 32–37)
MCV RBC AUTO: 91.8 FL — SIGNIFICANT CHANGE UP (ref 80–94)
METHADONE UR-MCNC: NEGATIVE — SIGNIFICANT CHANGE UP
MONOCYTES # BLD AUTO: 0.72 K/UL — HIGH (ref 0.1–0.6)
MONOCYTES NFR BLD AUTO: 13.3 % — HIGH (ref 1.7–9.3)
NEUTROPHILS # BLD AUTO: 2.14 K/UL — SIGNIFICANT CHANGE UP (ref 1.4–6.5)
NEUTROPHILS NFR BLD AUTO: 39.4 % — LOW (ref 42.2–75.2)
NITRITE UR-MCNC: NEGATIVE — SIGNIFICANT CHANGE UP
NRBC # BLD: 0 /100 WBCS — SIGNIFICANT CHANGE UP (ref 0–0)
OPIATES UR-MCNC: NEGATIVE — SIGNIFICANT CHANGE UP
PCP UR-MCNC: NEGATIVE — SIGNIFICANT CHANGE UP
PH UR: 6 — SIGNIFICANT CHANGE UP (ref 5–8)
PLATELET # BLD AUTO: 127 K/UL — LOW (ref 130–400)
POTASSIUM SERPL-MCNC: 3.9 MMOL/L — SIGNIFICANT CHANGE UP (ref 3.5–5)
POTASSIUM SERPL-SCNC: 3.9 MMOL/L — SIGNIFICANT CHANGE UP (ref 3.5–5)
PROPOXYPHENE QUALITATIVE URINE RESULT: NEGATIVE — SIGNIFICANT CHANGE UP
PROT SERPL-MCNC: 8 G/DL — SIGNIFICANT CHANGE UP (ref 6–8)
PROT UR-MCNC: 100 MG/DL
PROTHROM AB SERPL-ACNC: 11 SEC — SIGNIFICANT CHANGE UP (ref 9.95–12.87)
RBC # BLD: 4.73 M/UL — SIGNIFICANT CHANGE UP (ref 4.7–6.1)
RBC # FLD: 14 % — SIGNIFICANT CHANGE UP (ref 11.5–14.5)
RBC CASTS # UR COMP ASSIST: ABNORMAL /HPF
SALICYLATES SERPL-MCNC: <0.3 MG/DL — LOW (ref 4–30)
SARS-COV-2 RNA SPEC QL NAA+PROBE: SIGNIFICANT CHANGE UP
SODIUM SERPL-SCNC: 139 MMOL/L — SIGNIFICANT CHANGE UP (ref 135–146)
SP GR SPEC: 1.02 — SIGNIFICANT CHANGE UP (ref 1.01–1.03)
THC UR QL: NEGATIVE — SIGNIFICANT CHANGE UP
UROBILINOGEN FLD QL: 0.2 MG/DL — SIGNIFICANT CHANGE UP (ref 0.2–0.2)
WBC # BLD: 5.42 K/UL — SIGNIFICANT CHANGE UP (ref 4.8–10.8)
WBC # FLD AUTO: 5.42 K/UL — SIGNIFICANT CHANGE UP (ref 4.8–10.8)
WBC UR QL: SIGNIFICANT CHANGE UP /HPF

## 2020-06-11 PROCEDURE — 70450 CT HEAD/BRAIN W/O DYE: CPT | Mod: 26

## 2020-06-11 PROCEDURE — 99285 EMERGENCY DEPT VISIT HI MDM: CPT

## 2020-06-11 NOTE — ED PROVIDER NOTE - OBJECTIVE STATEMENT
48 y/o male with a PMH of PTSD, panic disorder, psychosis, asthma, and alcohol abuse presents to the ED by Dr. Simmons for evaluation of change in behavior. Pt admits to drinking alcohol daily. pt admits he drank vodka with seltzer today, amount equivalent to "two beers." Pt admits to talking to himself frequently. pt denies visual or auditory hallucinations, suicidal or homicidal ideations, illicit drug use, recent head trauma, visual changes, dizziness, fever, chills, n/v/d/c, abdominal pain, chest pain, sob, back pain, urinary symptoms, numbness, or tingling. See progress note about discussion with pt wife.

## 2020-06-11 NOTE — ED PROVIDER NOTE - PROGRESS NOTE DETAILS
spoke with pt wife akira #[phone]. akira states pt has had psychotic episodes in the past where is runs away, checks into hotels, tells the children people are chasing him; however, for the past few weeks pt has been staying isolated in the basement not showering, not eating. when speaking to the wife, and children pt is aggressive and frightening. pt has not been taking any of his psych medications. pt has been whispering to himself more frequently. wife noticed pt gait has changed, and he falls frequently. pt last fell two months ago and the right side of his face was swollen, but he was refusing any medical attention. pt memory has been declining. pt cannot recall his cellphone number which he has had for several years. pt loads the  and then puts the dirty dishes back into the cabinents. pt has no attempted to harm himself, or family members. wife called dr. meraz who suggested pt visit the ed. Alcohol level reviewed.  Patient requires extended observation in the ED before cleared for evaluation by Tele-psych accepting care from Dr. Arriola. pt no distress. awaiting pt to be sober. PA fellow note reviewed and agree, Patient will be observed in ED until sober at 4am for tele psych eval pt sleeping no distress pt awake, no distress. no signs of withdrawal. pt informed of need to f/u  micoscopic hematuria. spoke with tele psych. pt placed on list to be seen s/o Dr. Díaz. pt Involuntary admit. awaiting transfer Arjun: 2 PC signed.  pt informed.  he has a mild tremor.  Librium ordered.  he is medically clear for IPP admission.

## 2020-06-11 NOTE — ED PROVIDER NOTE - CLINICAL SUMMARY MEDICAL DECISION MAKING FREE TEXT BOX
pt with acute psychosis. admit to psych. cleared by psych. pt observed after initial alcohol level elevated. will admit as involuntary admission

## 2020-06-11 NOTE — ED PROVIDER NOTE - PHYSICAL EXAMINATION
Patient was seen and examined at bed side. patient doing better today. eating well. Patient waiting for long term placement. Patient has been accepted for long term placement at [facility], [place], operated by [facility].  Spoke with patient's daughter, Dora regarding above acceptance who reported that she needed to see facility first before deciding on placement  there. Daughter reported that she will go to [facility] this afternoon for a tour. Physical Exam    Vital Signs: I have reviewed the initial vital signs.  Constitutional: well-nourished, appears stated age, no acute distress. pt has active hand tremors wife and pt states this has been going on for a year. pt is intoxicated.   Eyes: Conjunctiva pink, Sclera clear, PERRLA, EOMI without pain.   ENT: No tongue fasciculations.   Cardiovascular: regular rate, regular rhythm, well-perfused extremities, radial pulses equal and 2+ b/l.   Respiratory: unlabored respiratory effort, clear to auscultation bilaterally no wheezing, rales and rhonchi. pt is speaking full sentences. no accessory muscle use.   Gastrointestinal: soft, non-tender, nondistended abdomen, no pulsatile mass, no rebound, no guarding  Musculoskeletal: supple neck, no lower extremity edema, no calf tenderness  Integumentary: warm, dry, no rash. no ecchymosis. no piloerections.   Neurologic: atraumatic, normocephalic. a&o x3 cranial nerves II-XII grossly intact, extremities’ motor and sensory functions grossly intact. finger to nose intact. negative pronator drift. steady gait.   Psychiatric: appropriate mood, appropriate affect Physical Exam    Vital Signs: I have reviewed the initial vital signs.  Constitutional: well-nourished, appears stated age, no acute distress. pt has active hand tremors wife and pt states this has been going on for a year. pt is intoxicated.   Eyes: Conjunctiva pink, Sclera clear, PERRLA, EOMI without pain.   ENT: No tongue fasciculations.   Cardiovascular: regular rate, regular rhythm, well-perfused extremities, radial pulses equal and 2+ b/l.   Respiratory: unlabored respiratory effort, clear to auscultation bilaterally no wheezing, rales and rhonchi. pt is speaking full sentences. no accessory muscle use.   Gastrointestinal: soft, non-tender, nondistended abdomen, no pulsatile mass, no rebound, no guarding  Musculoskeletal: supple neck, no lower extremity edema, no calf tenderness  Integumentary: warm, dry, no rash. no ecchymosis. no piloerections.   Neurologic: atraumatic, normocephalic. a&o x3 cranial nerves II-XII grossly intact, extremities’ motor and sensory functions grossly intact. finger to nose intact. negative pronator drift. steady gait.

## 2020-06-11 NOTE — ED PROVIDER NOTE - NS ED ROS FT
CONST: No fever, chills or bodyaches  EYES: No pain, redness, drainage or visual changes.  ENT: No ear pain or discharge, nasal discharge or congestion. No sore throat  CARD: No chest pain, palpitations  RESP: No SOB, cough, hemoptysis. No hx of asthma or COPD  GI: No abdominal pain, N/V/D  : No urinary symptoms  MS: No joint pain, back pain or extremity pain/injury  SKIN: No rashes  NEURO: No headache, dizziness, paresthesias or LOC

## 2020-06-11 NOTE — ED PROVIDER NOTE - ATTENDING CONTRIBUTION TO CARE
I personally evaluated patient. I agree with the findings and plan with all documentation on chart except as documented  in my note.    48 y/o M PMH ETOH Abuse and Dependence, PTSD, Underlying Psych disorder who is sent in by psychiatrist Dr. Pereyra for psychiatric evaluation and AMS.  Patient per Dr. Pereyra is a really bad alcoholic and has been developing psychiatric features. He sent to ED for Patient seen and evaluated by me.  Patient on a 1:1 and psychiatry consulted.  Appropriate clearance labs sent, EKG.  ED work up reviewed and psychiatry evaluated patient.  Patient is medically cleared. Will admit to psychiatry for further work up and treatment. evaluation and believes he requires involuntary IPP admission for psychosis.  Patient reports drinking today.  He denies any fever, falls, recent injuries.  Denies any HI or SI. Denies any other drug ingestions.  On exam, VS reviewed.  + AOB and patient slurring words.  He is clinically intoxicated.  Appropriate labs sent, EKG, urine studies.  Will monitor and plan is for evaluation by Tele Psych.  Dr. Pereyra can be called on his cell phone for collateral information.

## 2020-06-12 DIAGNOSIS — F31.9 BIPOLAR DISORDER, UNSPECIFIED: ICD-10-CM

## 2020-06-12 DIAGNOSIS — F10.20 ALCOHOL DEPENDENCE, UNCOMPLICATED: ICD-10-CM

## 2020-06-12 DIAGNOSIS — F10.230 ALCOHOL DEPENDENCE WITH WITHDRAWAL, UNCOMPLICATED: ICD-10-CM

## 2020-06-12 DIAGNOSIS — F43.10 POST-TRAUMATIC STRESS DISORDER, UNSPECIFIED: ICD-10-CM

## 2020-06-12 PROCEDURE — 90792 PSYCH DIAG EVAL W/MED SRVCS: CPT | Mod: 95

## 2020-06-12 PROCEDURE — 99232 SBSQ HOSP IP/OBS MODERATE 35: CPT

## 2020-06-12 RX ORDER — HYDROXYZINE HCL 10 MG
50 TABLET ORAL EVERY 6 HOURS
Refills: 0 | Status: DISCONTINUED | OUTPATIENT
Start: 2020-06-12 | End: 2020-06-18

## 2020-06-12 RX ORDER — FOLIC ACID 0.8 MG
1 TABLET ORAL DAILY
Refills: 0 | Status: DISCONTINUED | OUTPATIENT
Start: 2020-06-12 | End: 2020-06-18

## 2020-06-12 RX ORDER — ARIPIPRAZOLE 15 MG/1
5 TABLET ORAL DAILY
Refills: 0 | Status: DISCONTINUED | OUTPATIENT
Start: 2020-06-12 | End: 2020-06-16

## 2020-06-12 RX ORDER — THIAMINE MONONITRATE (VIT B1) 100 MG
100 TABLET ORAL DAILY
Refills: 0 | Status: DISCONTINUED | OUTPATIENT
Start: 2020-06-12 | End: 2020-06-18

## 2020-06-12 RX ADMIN — Medication 2 MILLIGRAM(S): at 16:09

## 2020-06-12 RX ADMIN — ARIPIPRAZOLE 5 MILLIGRAM(S): 15 TABLET ORAL at 18:25

## 2020-06-12 RX ADMIN — Medication 2 MILLIGRAM(S): at 19:02

## 2020-06-12 RX ADMIN — Medication 2 MILLIGRAM(S): at 16:08

## 2020-06-12 RX ADMIN — Medication 50 MILLIGRAM(S): at 07:51

## 2020-06-12 RX ADMIN — Medication 100 MILLIGRAM(S): at 16:08

## 2020-06-12 RX ADMIN — Medication 2 MILLIGRAM(S): at 21:36

## 2020-06-12 RX ADMIN — Medication 1 MILLIGRAM(S): at 16:10

## 2020-06-12 RX ADMIN — Medication 1 TABLET(S): at 16:08

## 2020-06-12 NOTE — PROGRESS NOTE BEHAVIORAL HEALTH - NSBHCHARTREVIEWINVESTIGATE_PSY_A_CORE FT
< from: 12 Lead ECG (06.11.20 @ 17:54) >    Ventricular Rate 85 BPM    Atrial Rate 85 BPM    P-R Interval 148 ms    QRS Duration 92 ms    Q-T Interval 392 ms    QTC Calculation(Bezet) 466 ms    < end of copied text >
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
## 2020-06-12 NOTE — PROGRESS NOTE BEHAVIORAL HEALTH - NSBHFUPADDHPIFT_PSY_A_CORE
Patient is a 48 yo  male; domiciled with wife and 3 children; on disability previously  and ; medical hx of asthma and hx of SAH in Jan 2020; psych hx of bipolar disorder with psychotic features and PSTD (9/11  as [employer]); no prior hospitalizations; no prior SA or NSSIB; substance use significant for alcohol use disorder with hx of rehab/detoxs; who was sent by his outpatient psychiatrist, Dr. Pereyra for evaluation.  , Utox negative on presentation.    On evaluation, pt reports that he has been non com    As per pt's wife, she reports that pt has been drinking alcohol " a lot." Has not been compliant with medications. Has been Reports pt has not been "functional", explains that pt has been unable to pay bills on his own, Patient is a 50 yo  male; domiciled with wife and 3 children; on disability previously  and ; medical hx of asthma and hx of SAH in Jan 2020; psych hx of bipolar disorder with psychotic features and PSTD (9/11  as [employer]); no prior hospitalizations; no prior SA or NSSIB; substance use significant for alcohol use disorder with hx of rehab/detoxs; who was sent by his outpatient psychiatrist, Dr. Pereyra for evaluation.  , Utox negative on presentation.    On evaluation, pt reports that he has been non compliant with medications, mainly due to "forgetting tot juan manuel them." States that his friend passed away about 1 month ago and reports he has been feeling depressed since then. Reports decrease in energy, sleeping too much, not having motivation to do anything, and arguing more with his wife. States that his psychiatrist told him to come to the ER "to get checked out." Pt reports that he has been drinking alcohol about 4 days out of the week. States he usually has about 3 drinks of vodka but reports drinking more alcohol yesterday. Denied having withdrawal seizures/DTs in the past. At the moment of evaluation, pt appeared tremulous and appeared to be in alcohol withdrawal despite receiving librium several hours before. Pt states he relapsed about 1 month ago after being 9 months sober. Denied A/V hallucinations. Denied suicidal/homicidal ideation, intent or plan.     As per pt's wife, she reports that pt has been drinking alcohol " a lot." States that he completed a rehab about 1 year ago and relapsed at home within a month of coming home. Reports that even when he was sober, he was still very "forgetful and absent minded." Has not been compliant with medications. Has been sobbing a lot, falling at home a lot, isolating, not eating, not showering, and sleeping too much. Reports pt has not been "functional", explains that pt has been unable to pay bills on his own. She will give him some task to do at home to keep him involved, like cutting up onions, and pt will get overwhelmed and then frustrated by the fact that he can't do it. She reports pt has been having memory problems, will forget to shut off the grill. Denied that pt ever made suicidal statements however states has not been doing well at home.

## 2020-06-12 NOTE — ED BEHAVIORAL HEALTH ASSESSMENT NOTE - PSYCHIATRIC ISSUES AND PLAN (INCLUDE STANDING AND PRN MEDICATION)
PRNS: haldol 5mg, ativan 2mg, diphenhydramine 50mg, PO/IM, Q6H for Agitation. Monitor vitals and EKG

## 2020-06-12 NOTE — ED BEHAVIORAL HEALTH ASSESSMENT NOTE - HPI (INCLUDE ILLNESS QUALITY, SEVERITY, DURATION, TIMING, CONTEXT, MODIFYING FACTORS, ASSOCIATED SIGNS AND SYMPTOMS)
·48 y/o M PMH ETOH Abuse and Dependence, PTSD, Underlying Psych disorder who is sent in by psychiatrist Dr. Pereyra for psychiatric evaluation and AMS.  Patient per Dr. Pereyra is a really bad alcoholic and has been developing psychiatric features. He sent to ED for Patient seen and evaluated by me.  Patient on a 1:1 and psychiatry consulted.  Appropriate clearance labs sent, EKG.  ED work up reviewed and psychiatry evaluated patient.  Patient is medically cleared. Will admit to psychiatry for further work up and treatment. evaluation and believes he requires involuntary IPP admission for psychosis.  Patient reports drinking today.  He denies any fever, falls, recent injuries.  Denies any HI or SI. Denies any other drug ingestions.  On exam, VS reviewed.  + AOB and patient slurring words.  He is clinically intoxicated.  Appropriate labs sent, EKG, urine studies.  Will monitor and plan is for evaluation by Tele Psych.  Dr. Pereyra can be called on his cell phone for collateral information.    , Utox negative    Date: 6/12/20 Time: 04:05  Clinician Name: Leobardo Pt Location: [facility]  Pt Name: Oseas Rodriguez  Pt MRN:[id number]         Records checked/sent to [facility] – with data:   [facility] ED, google search, [facility]         [email or web address]         Records checked- no data:, [facility]ry, [facility] ED Visits, [facility] Outpatient BH, [facility], [facility], [facility], [facility], [facility], [facility], [facility], [facility], [facility], [facility], [facility], [facility] ED, [facility], [facility], Patient is a 48 yo  male; domiciled with wife and 3 children; on disability previously  and ; medical hx of asthma and hx of SAH in Jan 2020; psych hx of bipolar disorder with psychotic features and PSTD (9/11  as [employer]); no prior hospitalizations; no prior SA or NSSIB; substance use significant for alcohol use disorder with hx of rehab/detoxs; who was sent by his outpatient psychiatrist, Dr. Pereyra for evaluation.  , Utox negative on presentation.     On interview, patient reports that he has been feeling depressed for the last month and does not have the motivation to get out of bed. States that he spends a lot of the day sleeping and endorses decreased energy/concentration and anhedonia. Denies AVH, initially endorsed some paranoia then retracted. Denies manic symptoms. Reports symptoms of anxiety and PTSD. States that he often forgets to take his medication and does not remember the names . Endorses drinking 3-4 vodka drinks daily. Denies hx of complicated withdrawal. Denies any other substance use. Denies any suicidal thoughts/behaviors/intentions. Denies any prior SA. Denies any HI. Appears to be minimizing on interview.    Spoke with treating psychiatrist Dr. Pereyra who reports that patient has been decompensating and has been largely non compliant with medications. Has been not able to function recently and is also having symptoms concerning for psychosis. Feels he would greatly benefit from admission.     Date: 6/12/20 Time: 04:05  Clinician Name: Leobardo Torres Location: [facility]  Pt Name: Oseas Rodriguez  Pt MRN:[id number]         Records checked/sent to [facility] – with data:   [facility], google search, [facility]patient Medical         [email or web address]         Records checked- no data:, [facility], [facility] ED Visits, [facility] Out[facility] BH, [facility] tab, [facility], [facility], [facility], [facility], [facility], [facility], [facility], [facility], [facility], [facility], [facility] ED, [facility] ivey,

## 2020-06-12 NOTE — PROGRESS NOTE BEHAVIORAL HEALTH - NSBHADDHXPSYCHFT_PSY_A_CORE
In treatment with Dr. Pereyra.   Denied previous hospitalizations. Denied previous suicide attempts.     As per [facility] pharmacy: Pt is on Lexapro 5 mg qd, Abilify 7.5 mg qd, naltrexone 50 mg qd, Vistaril 50 mg BID, Trileptal 300 mg TID. Was also on Buspar 15 mg TID in Jan 2020.

## 2020-06-12 NOTE — PROGRESS NOTE BEHAVIORAL HEALTH - NSBHADDHXSUBSTFT_PSY_A_CORE
Denied drug use. Has history of alcohol use d/o. Been in treatment in rehab about 1 year ago. Reports being sober for 9 months prior to his relapse about 1 month ago. Seems to be minimizing his drinking however reports drinking 4 days a week. States he drinks about 3 shots of vodka when he drinks, last drink was yesterday however states he "drank more yesterday." BAL in ER was 332.

## 2020-06-12 NOTE — PROGRESS NOTE BEHAVIORAL HEALTH - NSBHCHARTREVIEWLAB_PSY_A_CORE FT
Comprehensive Metabolic Panel (06.11.20 @ 17:56)    Sodium, Serum: 139 mmol/L    Potassium, Serum: 3.9 mmol/L    Chloride, Serum: 101 mmol/L    Carbon Dioxide, Serum: 24 mmol/L    Anion Gap, Serum: 14 mmol/L    Blood Urea Nitrogen, Serum: 11 mg/dL    Creatinine, Serum: 0.7 mg/dL    Glucose, Serum: 110 mg/dL    Calcium, Total Serum: 9.0 mg/dL    Protein Total, Serum: 8.0 g/dL    Albumin, Serum: 5.0 g/dL    Bilirubin Total, Serum: 0.4 mg/dL    Alkaline Phosphatase, Serum: 68 U/L    Aspartate Aminotransferase (AST/SGOT): 80 U/L    Alanine Aminotransferase (ALT/SGPT): 58 U/L    eGFR if Non : 112: Interpretative comment    Complete Blood Count + Automated Diff (06.11.20 @ 17:56)    WBC Count: 5.42 K/uL    RBC Count: 4.73 M/uL    Hemoglobin: 15.0 g/dL    Hematocrit: 43.4 %    Mean Cell Volume: 91.8 fL    Mean Cell Hemoglobin: 31.7 pg    Mean Cell Hemoglobin Conc: 34.6 g/dL    Red Cell Distrib Width: 14.0 %    Platelet Count - Automated: 127 K/uL    Auto Neutrophil #: 2.14 K/uL    Auto Lymphocyte #: 2.37 K/uL    Auto Monocyte #: 0.72 K/uL    Auto Eosinophil #: 0.15 K/uL    Auto Basophil #: 0.03 K/uL    Auto Neutrophil %: 39.4: Differential percentages must be correlated with absolute numbers for  clinical significance. %    Auto Lymphocyte %: 43.7 %    Auto Monocyte %: 13.3 %    Auto Eosinophil %: 2.8 %    Auto Basophil %: 0.6 %    Auto Immature Granulocyte %: 0.2 %    Nucleated RBC: 0 /100 WBCs

## 2020-06-12 NOTE — PROGRESS NOTE BEHAVIORAL HEALTH - NSBHADDHXPSYSOCFT_PSY_A_CORE
Was a , than a . Retired currently. Was a , than a . Retired currently. Lives with wife and children.

## 2020-06-12 NOTE — PROGRESS NOTE BEHAVIORAL HEALTH - SUMMARY
Patient is a 48 yo  male; domiciled with wife and 3 children; on disability previously  and ; medical hx of asthma and hx of SAH in Jan 2020; psych hx of bipolar disorder with psychotic features and PSTD (9/11  as [employer]); no prior hospitalizations; no prior SA or NSSIB; substance use significant for alcohol use disorder with hx of rehab/detoxs; who was sent by his outpatient psychiatrist, Dr. Pereyra for evaluation.  , Utox negative on presentation.    Bipolar d/o  -    Alcohol use d/o  - MVI/folate/thiamine  - Will consider restarting naltrexone once pt is tapered off Ativan    Alcohol withdrawal  - Monitor for alcohol withdrawal symptoms  - Started on Ativan taper Patient is a 50 yo  male; domiciled with wife and 3 children; on disability previously  and ; medical hx of asthma and hx of SAH in Jan 2020; psych hx of bipolar disorder with psychotic features and PSTD (9/11  as [employer]); no prior hospitalizations; no prior SA or NSSIB; substance use significant for alcohol use disorder with hx of rehab/detoxs; who was sent by his outpatient psychiatrist, Dr. Pereyra for evaluation.  , Utox negative on presentation.    Bipolar d/o  - Restart Abilify 5 mg qd     Alcohol use d/o  - MVI/folate/thiamine  - Will consider restarting naltrexone once pt is tapered off Ativan    Alcohol withdrawal  - Monitor for alcohol withdrawal symptoms  - Started on Ativan taper

## 2020-06-12 NOTE — ED BEHAVIORAL HEALTH ASSESSMENT NOTE - SUMMARY
Patient is a 48 yo  male; domiciled with wife and 3 children; on disability previously  and ; medical hx of asthma and hx of SAH in Jan 2020; psych hx of bipolar disorder with psychotic features and PSTD (9/11  as [employer]); no prior hospitalizations; no prior SA or NSSIB; substance use significant for alcohol use disorder with hx of rehab/detoxs; who was sent by his outpatient psychiatrist, Dr. Pereyra for evaluation.  , Utox negative on presentation. Family and outpatient psychiatrist concerned for worsening psychotic and depressive symptoms in context of medication non compliance and worsening substance abuse. Involuntary admission to [facility]

## 2020-06-12 NOTE — CHART NOTE - NSCHARTNOTEFT_GEN_A_CORE
Social Work Admit Note:    Patient is 49 years of age male who was admitted for evaluation of possible psychosis.  At time of assessment in the emergency department patient endorsed feeling depressed for a period of one month.  Low energy endorsed.  Patient informed he has been sleeping for most of the day.  Other symptoms endorsed by patient included anhedonia and decreased concentration.   Audio / visual hallucinations denied.  Use of alcohol endorsed as three to four vodka drinks.  Patient denies history of suicide attempts and denies homicidal ideation.      In the community patient resides in a private residence in [place] with his spouse and three children.  He receives disability benefits.  In the past patient was employed as a .  Treatment history has been for bipolar disorder and PTSD from 9/11 .  Patient has no prior inpatient psychiatric admissions.  His outpatient mental health provider is psychiatrist Dr. Pereyra.  He has legal history of DWI in 2015.       Sexual History – patient identifies as heterosexual.   	  Family relationships and history – Patient’s spouse was identified as primary social support.        Leisure Activity Assessment – spending time with his family and children.        Community Supports – No known attendance in any self- help groups or other organizations.     Employment – patient is disabled    Substance Use Assessment – use of alcohol endorsed    History of suicidality or self- injurious behaviors – no known history     Significant Loses – None identified.      Life Goals – patient verbalized goal of returning improved mental health     will continue to meet with patient 1:1 and with treatment team daily.  Discharge plan is for continued mental health treatment in outpatient setting.  Referral to address alcohol use to be discussed.      Please refer to Social Work Psychosocial for additional information.

## 2020-06-13 PROCEDURE — 99232 SBSQ HOSP IP/OBS MODERATE 35: CPT

## 2020-06-13 RX ADMIN — ARIPIPRAZOLE 5 MILLIGRAM(S): 15 TABLET ORAL at 08:32

## 2020-06-13 RX ADMIN — Medication 1 MILLIGRAM(S): at 08:32

## 2020-06-13 RX ADMIN — Medication 1.5 MILLIGRAM(S): at 13:49

## 2020-06-13 RX ADMIN — Medication 1.5 MILLIGRAM(S): at 08:34

## 2020-06-13 RX ADMIN — Medication 1 TABLET(S): at 08:32

## 2020-06-13 RX ADMIN — Medication 2 MILLIGRAM(S): at 06:19

## 2020-06-13 RX ADMIN — Medication 1.5 MILLIGRAM(S): at 17:32

## 2020-06-13 RX ADMIN — Medication 100 MILLIGRAM(S): at 08:32

## 2020-06-13 RX ADMIN — Medication 2 MILLIGRAM(S): at 01:44

## 2020-06-13 NOTE — PROGRESS NOTE BEHAVIORAL HEALTH - SUMMARY
Patient is a 50 yo  male; domiciled with wife and 3 children; on disability previously  and ; medical hx of asthma and hx of SAH in Jan 2020; psych hx of bipolar disorder with psychotic features and PSTD (9/11  as [employer]); no prior hospitalizations; no prior SA or NSSIB; substance use significant for alcohol use disorder with hx of rehab/detoxs; who was sent by his outpatient psychiatrist, Dr. Pereyra for evaluation.  , Utox negative on presentation.    Bipolar d/o  - Restart Abilify 5 mg qd     Alcohol use d/o  - MVI/folate/thiamine  - Will consider restarting naltrexone once pt is tapered off Ativan    Alcohol withdrawal  - Monitor for alcohol withdrawal symptoms  - Started on Ativan taper

## 2020-06-13 NOTE — PROGRESS NOTE BEHAVIORAL HEALTH - NSBHFUPINTERVALHXFT_PSY_A_CORE
Pt was seen, evaluated, chart reviewed.  As per nursing staff, no events overnight. On evaluation, pt reports that his withdrawal symptoms "are better" and seems less tremulous on interview. Pt seems to be isolative to his room. When asked about his memory and forgetfulness, pt stated "my wife is just worried too much, I'm fine." Pt seems to be minimizing his symptoms, focused on discharge. Is compliant with medication, denies negative side effects. Endorsed good sleep and appetite. Denied A/V hallucinations. Denied suicidal/homicidal ideation, intent or plan.

## 2020-06-14 PROCEDURE — 99231 SBSQ HOSP IP/OBS SF/LOW 25: CPT

## 2020-06-14 RX ADMIN — Medication 1 MILLIGRAM(S): at 18:24

## 2020-06-14 RX ADMIN — Medication 1 MILLIGRAM(S): at 11:09

## 2020-06-14 RX ADMIN — Medication 50 MILLIGRAM(S): at 22:16

## 2020-06-14 RX ADMIN — ARIPIPRAZOLE 5 MILLIGRAM(S): 15 TABLET ORAL at 08:10

## 2020-06-14 RX ADMIN — Medication 1 MILLIGRAM(S): at 22:17

## 2020-06-14 RX ADMIN — Medication 1 TABLET(S): at 11:09

## 2020-06-14 RX ADMIN — Medication 1 MILLIGRAM(S): at 08:10

## 2020-06-14 RX ADMIN — Medication 1.5 MILLIGRAM(S): at 06:38

## 2020-06-14 RX ADMIN — Medication 50 MILLIGRAM(S): at 00:40

## 2020-06-14 RX ADMIN — Medication 100 MILLIGRAM(S): at 08:10

## 2020-06-14 RX ADMIN — Medication 1.5 MILLIGRAM(S): at 00:40

## 2020-06-14 NOTE — PROGRESS NOTE BEHAVIORAL HEALTH - NSBHFUPINTERVALHXFT_PSY_A_CORE
Pt was seen, evaluated, chart reviewed.  As per nursing staff, no events overnight. On evaluation, pt denies withdrawals symptoms and seems less tremulous on interview. Pt seems to be isolative to his room. When asked about his memory and forgetfulness, pt stated "my wife is just worried too much, I'm fine." Pt seems to be minimizing his symptoms, focused on discharge. Is compliant with medication, denies negative side effects. Endorsed good sleep and appetite. Denied A/V hallucinations. Denied suicidal/homicidal ideation, intent or plan.

## 2020-06-14 NOTE — CONSULT NOTE ADULT - SUBJECTIVE AND OBJECTIVE BOX
KAYE WYATT  49y  Male      Patient is a 49y old  Male who presents with a chief complaint of Depressed, sleeping too much, drinking too much (12 Jun 2020 11:12)    HPI:    INTERVAL HPI/OVERNIGHT EVENTS:  HEALTH ISSUES - PROBLEM Dx:  Alcohol withdrawal syndrome without complication  Alcohol use disorder, moderate, dependence  PTSD (post-traumatic stress disorder)  Bipolar disorder with psychotic features        PAST MEDICAL & SURGICAL HISTORY:  Asthma, unspecified asthma severity, unspecified whether complicated, unspecified whether persistent  PTSD (post-traumatic stress disorder)  No significant past surgical history    FAMILY HISTORY:    ARIPiprazole 5 milliGRAM(s) Oral daily  folic acid 1 milliGRAM(s) Oral daily  hydrOXYzine hydrochloride 50 milliGRAM(s) Oral every 6 hours PRN  LORazepam     Tablet   Oral   LORazepam     Tablet 1 milliGRAM(s) Oral every 4 hours  multivitamin 1 Tablet(s) Oral daily  thiamine 100 milliGRAM(s) Oral daily      REVIEW OF SYSTEMS:  CONSTITUTIONAL: No fever, weight loss, or fatigue  EYES: No eye pain, visual disturbances, or discharge  ENMT:  No difficulty hearing, tinnitus, vertigo; No sinus or throat pain  NECK: No pain or stiffness  BREASTS: No pain, masses, or nipple discharge  RESPIRATORY: No cough, wheezing, chills or hemoptysis; No shortness of breath  CARDIOVASCULAR: No chest pain, palpitations, dizziness, or leg swelling  GASTROINTESTINAL: No abdominal or epigastric pain. No nausea, vomiting, or hematemesis; No diarrhea or constipation. No melena or hematochezia.  GENITOURINARY: No dysuria, frequency, hematuria, or incontinence  NEUROLOGICAL: No headaches, memory loss, loss of strength, numbness, or tremors  SKIN: No itching, burning, rashes, or lesions   LYMPH NODES: No enlarged glands  ENDOCRINE: No heat or cold intolerance; No hair loss  MUSCULOSKELETAL: No joint pain or swelling; No muscle, back, or extremity pain  PSYCHIATRIC: as per hpi and previous psych history  HEME/LYMPH: No easy bruising, or bleeding gums  ALLERY AND IMMUNOLOGIC: No hives or eczema    T(C): 35.8 (06-14-20 @ 08:47), Max: 36.8 (06-13-20 @ 16:00)  HR: 114 (06-14-20 @ 08:47) (80 - 115)  BP: 120/86 (06-14-20 @ 08:47) (108/63 - 128/97)  RR: 16 (06-14-20 @ 08:47) (16 - 19)  SpO2: --  Wt(kg): --Vital Signs Last 24 Hrs  T(C): 35.8 (14 Jun 2020 08:47), Max: 36.8 (13 Jun 2020 16:00)  T(F): 96.5 (14 Jun 2020 08:47), Max: 98.2 (13 Jun 2020 16:00)  HR: 114 (14 Jun 2020 08:47) (80 - 115)  BP: 120/86 (14 Jun 2020 08:47) (108/63 - 128/97)  BP(mean): --  RR: 16 (14 Jun 2020 08:47) (16 - 19)  SpO2: --    PHYSICAL EXAM:  GENERAL: NAD,well-developed  HEAD:  Atraumatic, Normocephalic  EYES: EOMI, PERRLA, conjunctiva and sclera clear  ENMT: Moist mucous membranes, Good dentition, No lesions  NECK: Supple, No JVD, Normal thyroid  CHEST/LUNG: Clear bs bilaterally; No rales, rhonchi, wheezing  HEART: Regular rate and rhythm; No murmurs, rubs, or gallops  ABDOMEN: Soft, Nontender, Nondistended; Bowel sounds present  EXTREMITIES:  2+ Peripheral Pulses, No clubbing, cyanosis, or edema  LYMPH: No lymphadenopathy noted  SKIN: No rashes or lesions  Neuro: alert  no focal deficits    Consultant(s) Notes Reviewed:  [x ] YES  [ ] NO  Care Discussed with Consultants/Other Providers [ x] YES  [ ] NO    LABS:              CAPILLARY BLOOD GLUCOSE                RADIOLOGY & ADDITIONAL TESTS:    Imaging Personally Reviewed:  [ ] YES  [ ] NO

## 2020-06-14 NOTE — CONSULT NOTE ADULT - ASSESSMENT
medically stable with no acute issues  no comlaints lyibng in bed  not sure why needs to be here   see hpi   pa note revwied

## 2020-06-15 PROCEDURE — 99231 SBSQ HOSP IP/OBS SF/LOW 25: CPT

## 2020-06-15 RX ORDER — NICOTINE POLACRILEX 2 MG
2 GUM BUCCAL THREE TIMES A DAY
Refills: 0 | Status: DISCONTINUED | OUTPATIENT
Start: 2020-06-15 | End: 2020-06-18

## 2020-06-15 RX ORDER — NICOTINE POLACRILEX 2 MG
1 GUM BUCCAL DAILY
Refills: 0 | Status: DISCONTINUED | OUTPATIENT
Start: 2020-06-15 | End: 2020-06-18

## 2020-06-15 RX ADMIN — Medication 0.5 MILLIGRAM(S): at 22:18

## 2020-06-15 RX ADMIN — Medication 1 TABLET(S): at 08:23

## 2020-06-15 RX ADMIN — Medication 1 PATCH: at 13:14

## 2020-06-15 RX ADMIN — Medication 0.5 MILLIGRAM(S): at 13:41

## 2020-06-15 RX ADMIN — Medication 1 MILLIGRAM(S): at 06:00

## 2020-06-15 RX ADMIN — ARIPIPRAZOLE 5 MILLIGRAM(S): 15 TABLET ORAL at 08:22

## 2020-06-15 RX ADMIN — Medication 100 MILLIGRAM(S): at 08:22

## 2020-06-15 RX ADMIN — Medication 0.5 MILLIGRAM(S): at 10:00

## 2020-06-15 RX ADMIN — Medication 1 MILLIGRAM(S): at 08:22

## 2020-06-15 RX ADMIN — Medication 0.5 MILLIGRAM(S): at 17:51

## 2020-06-15 NOTE — CHART NOTE - NSCHARTNOTEFT_GEN_A_CORE
Mr. Rodriguez remains on the unit for continued treatment, safety and observation. He attended all three Social Work groups today including Value and Contribution, Crisis Skills and Exploring Recovery. He remained focused, engaged and actively participated in all three groups.      met with Mr. Rodriguez one on one to discuss the progress he feels he has made on the unit and to address any concerns. Patient was pleasant upon approach and open to interview. He reports enjoying groups on the unit as they are smaller and more intimate, allowing him to feel comfortable to share. He discussed his desire to maintain sobriety and follow up with outpatient treatment.  reviewed some of the forms that were handed out in group, including a list of Alcoholics Anonymous meetings in the community currently following social distancing guidelines and a list of online/ internet recovery resources. Patient is open to attending groups in the community. The benefits of securing a sponsor were also discussed. Mr. Rodriguez did not have any additional concerns.      will continue to meet with Mr. Rodriguez for education, support and assistance with planning for discharge. He will continue to be encouraged to attend groups on the unit (groups are spaced out and patients are seated at least six feet away from each other due to COVID19).

## 2020-06-15 NOTE — PROGRESS NOTE BEHAVIORAL HEALTH - SUMMARY
Patient is a 50 yo  male; domiciled with wife and 3 children; on disability previously  and ; medical hx of asthma and hx of SAH in Jan 2020; psych hx of bipolar disorder with psychotic features and PSTD (9/11  as [employer]); no prior hospitalizations; no prior SA or NSSIB; substance use significant for alcohol use disorder with hx of rehab/detoxs; who was sent by his outpatient psychiatrist, Dr. Pereyra for evaluation.  , Utox negative on presentation.    Bipolar d/o  - Continue Abilify 5 mg qd     Alcohol use d/o  - MVI/folate/thiamine  - Will consider restarting naltrexone once pt is tapered off Ativan    Alcohol withdrawal  - Monitor for alcohol withdrawal symptoms  - Started on Ativan taper

## 2020-06-15 NOTE — PROGRESS NOTE BEHAVIORAL HEALTH - NSBHFUPINTERVALHXFT_PSY_A_CORE
Pt was seen, evaluated, chart reviewed.  As per nursing staff, no events overnight. On evaluation, pt reports that he is feeling "better." Explains that he feels "less shaky" and doesn't experience cold sweats anymore. Denied alcohol withdrawal symptoms, however is still on an Ativan taper. Pt talks about some of his PTSD symptoms, such as feeling hypervigilant, experiencing some flashbacks at times, dislike of being in [place]. He does acknowledge that he was drinking more alcohol since his friend/co-worker passed away about a week ago. Reports that he is not interested in going to inpatient rehab at this time however would like to go to AA and found some other support meetings that are happening outside right now on [place] (pt had a list in his room that he shared with the team). Reports staying in touch with his wife. Is compliant with medication, denies negative side effects. Endorsed good sleep and appetite. Denied A/V hallucinations. Denied paranoia. Denied suicidal/homicidal ideation, intent or plan.

## 2020-06-15 NOTE — PROGRESS NOTE ADULT - SUBJECTIVE AND OBJECTIVE BOX
pt stable alert in NAD  no new complaints    PSYCHOSIS ALCOHOL ABUSE  ^CONFUSION  Handoff  MEWS Score  Asthma, unspecified asthma severity, unspecified whether complicated, unspecified whether persistent  PTSD (post-traumatic stress disorder)  Psychosis  Alcohol withdrawal syndrome without complication  Alcohol use disorder, moderate, dependence  PTSD (post-traumatic stress disorder)  Bipolar disorder with psychotic features  No significant past surgical history  CONFUSION  90+  Alcohol abuse    HEALTH ISSUES - PROBLEM Dx:  Alcohol withdrawal syndrome without complication  Alcohol use disorder, moderate, dependence  PTSD (post-traumatic stress disorder)  Bipolar disorder with psychotic features        PAST MEDICAL & SURGICAL HISTORY:  Asthma, unspecified asthma severity, unspecified whether complicated, unspecified whether persistent  PTSD (post-traumatic stress disorder)  No significant past surgical history    No Known Allergies      FAMILY HISTORY:      ARIPiprazole 5 milliGRAM(s) Oral daily  folic acid 1 milliGRAM(s) Oral daily  hydrOXYzine hydrochloride 50 milliGRAM(s) Oral every 6 hours PRN  LORazepam     Tablet   Oral   LORazepam     Tablet 0.5 milliGRAM(s) Oral every 4 hours  multivitamin 1 Tablet(s) Oral daily  thiamine 100 milliGRAM(s) Oral daily      T(C): 36.7 (06-15-20 @ 06:17), Max: 36.7 (06-15-20 @ 06:17)  HR: 70 (06-15-20 @ 06:17) (70 - 114)  BP: 128/81 (06-15-20 @ 06:17) (120/86 - 128/81)  RR: 18 (06-15-20 @ 06:17) (16 - 18)  SpO2: --    PE;  general:  no changes noted in nad      Lungs:    Heart:    EXT:    Neuro:  aelrt no deficits                          CAPILLARY BLOOD GLUCOSE

## 2020-06-16 PROCEDURE — 99231 SBSQ HOSP IP/OBS SF/LOW 25: CPT

## 2020-06-16 RX ORDER — ARIPIPRAZOLE 15 MG/1
10 TABLET ORAL DAILY
Refills: 0 | Status: DISCONTINUED | OUTPATIENT
Start: 2020-06-17 | End: 2020-06-18

## 2020-06-16 RX ORDER — MIRTAZAPINE 45 MG/1
7.5 TABLET, ORALLY DISINTEGRATING ORAL AT BEDTIME
Refills: 0 | Status: DISCONTINUED | OUTPATIENT
Start: 2020-06-16 | End: 2020-06-18

## 2020-06-16 RX ADMIN — Medication 0.5 MILLIGRAM(S): at 01:43

## 2020-06-16 RX ADMIN — Medication 1 PATCH: at 14:50

## 2020-06-16 RX ADMIN — Medication 1 PATCH: at 19:16

## 2020-06-16 RX ADMIN — ARIPIPRAZOLE 5 MILLIGRAM(S): 15 TABLET ORAL at 08:25

## 2020-06-16 RX ADMIN — Medication 1 MILLIGRAM(S): at 08:25

## 2020-06-16 RX ADMIN — Medication 2 MILLIGRAM(S): at 20:29

## 2020-06-16 RX ADMIN — Medication 2 MILLIGRAM(S): at 13:59

## 2020-06-16 RX ADMIN — Medication 100 MILLIGRAM(S): at 08:25

## 2020-06-16 RX ADMIN — Medication 50 MILLIGRAM(S): at 13:59

## 2020-06-16 RX ADMIN — Medication 1 PATCH: at 08:28

## 2020-06-16 RX ADMIN — MIRTAZAPINE 7.5 MILLIGRAM(S): 45 TABLET, ORALLY DISINTEGRATING ORAL at 20:14

## 2020-06-16 RX ADMIN — Medication 1 TABLET(S): at 08:25

## 2020-06-16 RX ADMIN — Medication 2 MILLIGRAM(S): at 08:28

## 2020-06-16 RX ADMIN — Medication 1 PATCH: at 08:44

## 2020-06-16 RX ADMIN — Medication 50 MILLIGRAM(S): at 23:38

## 2020-06-16 NOTE — PROGRESS NOTE BEHAVIORAL HEALTH - NSBHFUPINTERVALHXFT_PSY_A_CORE
Pt was seen, evaluated, chart reviewed.  As per nursing staff, no events overnight. On evaluation, pt reports he is "fine." States he is not experiencing any withdrawal symptoms. Pt was encouraged to go to inpatient rehab unit, however states he is not interested. Is compliant with medication, denies negative side effects. Endorsed fair sleep and appetite. Denied A/V hallucinations. Although admits that he occasionally "mumbles to himself." Explains that last night when he couldn't sleep, he was telling himself that he "wants to go home." Denied suicidal/homicidal ideation, intent or plan.    As per pt's wife, she reports that she has been speaking to him daily. Feels that pt sounded better today, more calm. She has no safety concerns.

## 2020-06-16 NOTE — PROGRESS NOTE BEHAVIORAL HEALTH - SUMMARY
Patient is a 48 yo  male; domiciled with wife and 3 children; on disability previously  and ; medical hx of asthma and hx of SAH in Jan 2020; psych hx of bipolar disorder with psychotic features and PSTD (9/11  as [employer]); no prior hospitalizations; no prior SA or NSSIB; substance use significant for alcohol use disorder with hx of rehab/detoxs; who was sent by his outpatient psychiatrist, Dr. Pereyra for evaluation.  , Utox negative on presentation.    Bipolar d/o  - Increase Abilify to 10 mg qd   - Remeron 7.5 mg qhs for insomnia    Alcohol use d/o  - MVI/folate/thiamine    Alcohol withdrawal  - Monitor for alcohol withdrawal symptoms  - Completed Ativan taper today Patient is a 48 yo  male; domiciled with wife and 3 children; on disability previously  and ; medical hx of asthma and hx of SAH in Jan 2020; psych hx of bipolar disorder with psychotic features and PSTD (9/11  as [employer]); no prior hospitalizations; no prior SA or NSSIB; substance use significant for alcohol use disorder with hx of rehab/detoxs; who was sent by his outpatient psychiatrist, Dr. Pereyra for evaluation.  , Utox negative on presentation.    Bipolar d/o  - Increase Abilify to 10 mg qd   - Remeron 7.5 mg qhs for insomnia    Alcohol use d/o  - MVI/folate/thiamine  - MOCA 26/30    Alcohol withdrawal  - Monitor for alcohol withdrawal symptoms  - Completed Ativan taper today

## 2020-06-17 PROCEDURE — 99231 SBSQ HOSP IP/OBS SF/LOW 25: CPT

## 2020-06-17 RX ORDER — BUPROPION HYDROCHLORIDE 150 MG/1
0 TABLET, EXTENDED RELEASE ORAL
Qty: 0 | Refills: 0 | DISCHARGE

## 2020-06-17 RX ORDER — THIAMINE MONONITRATE (VIT B1) 100 MG
1 TABLET ORAL
Qty: 0 | Refills: 0 | DISCHARGE
Start: 2020-06-17

## 2020-06-17 RX ORDER — FOLIC ACID 0.8 MG
1 TABLET ORAL
Qty: 0 | Refills: 0 | DISCHARGE
Start: 2020-06-17

## 2020-06-17 RX ADMIN — Medication 50 MILLIGRAM(S): at 20:20

## 2020-06-17 RX ADMIN — Medication 2 MILLIGRAM(S): at 08:55

## 2020-06-17 RX ADMIN — Medication 2 MILLIGRAM(S): at 20:21

## 2020-06-17 RX ADMIN — Medication 100 MILLIGRAM(S): at 08:48

## 2020-06-17 RX ADMIN — ARIPIPRAZOLE 10 MILLIGRAM(S): 15 TABLET ORAL at 08:47

## 2020-06-17 RX ADMIN — Medication 2 MILLIGRAM(S): at 17:09

## 2020-06-17 RX ADMIN — Medication 1 MILLIGRAM(S): at 08:47

## 2020-06-17 RX ADMIN — Medication 1 PATCH: at 08:55

## 2020-06-17 RX ADMIN — Medication 1 PATCH: at 08:00

## 2020-06-17 RX ADMIN — Medication 1 TABLET(S): at 08:48

## 2020-06-17 RX ADMIN — Medication 1 PATCH: at 08:48

## 2020-06-17 RX ADMIN — Medication 1 PATCH: at 20:57

## 2020-06-17 RX ADMIN — MIRTAZAPINE 7.5 MILLIGRAM(S): 45 TABLET, ORALLY DISINTEGRATING ORAL at 20:18

## 2020-06-17 NOTE — PROGRESS NOTE BEHAVIORAL HEALTH - NSBHADMITIPOBS_PSY_A_CORE
Enhanced supervision

## 2020-06-17 NOTE — DISCHARGE NOTE BEHAVIORAL HEALTH - HPI (INCLUDE ILLNESS QUALITY, SEVERITY, DURATION, TIMING, CONTEXT, MODIFYING FACTORS, ASSOCIATED SIGNS AND SYMPTOMS)
Patient is a 48 yo  male; domiciled with wife and 3 children; on disability previously  and ; medical hx of asthma and hx of SAH in Jan 2020; psych hx of bipolar disorder with psychotic features and PSTD (9/11  as [employer]); no prior hospitalizations; no prior SA or NSSIB; substance use significant for alcohol use disorder with hx of rehab/detoxs; who was sent by his outpatient psychiatrist, Dr. Pereyra for evaluation.  , Utox negative on presentation.    On evaluation, pt reports that he has been non compliant with medications, mainly due to "forgetting tot juan manuel them." States that his friend passed away about 1 month ago and reports he has been feeling depressed since then. Reports decrease in energy, sleeping too much, not having motivation to do anything, and arguing more with his wife. States that his psychiatrist told him to come to the ER "to get checked out." Pt reports that he has been drinking alcohol about 4 days out of the week. States he usually has about 3 drinks of vodka but reports drinking more alcohol yesterday. Denied having withdrawal seizures/DTs in the past. At the moment of evaluation, pt appeared tremulous and appeared to be in alcohol withdrawal despite receiving librium several hours before. Pt states he relapsed about 1 month ago after being 9 months sober. Denied A/V hallucinations. Denied suicidal/homicidal ideation, intent or plan.     As per pt's wife, she reports that pt has been drinking alcohol " a lot." States that he completed a rehab about 1 year ago and relapsed at home within a month of coming home. Reports that even when he was sober, he was still very "forgetful and absent minded." Has not been compliant with medications. Has been sobbing a lot, falling at home a lot, isolating, not eating, not showering, and sleeping too much. Reports pt has not been "functional", explains that pt has been unable to pay bills on his own. She will give him some task to do at home to keep him involved, like cutting up onions, and pt will get overwhelmed and then frustrated by the fact that he can't do it. She reports pt has been having memory problems, will forget to shut off the grill. Denied that pt ever made suicidal statements however states has not been doing well at home.

## 2020-06-17 NOTE — DISCHARGE NOTE BEHAVIORAL HEALTH - NSBHDCMEDSFT_PSY_A_CORE
Pt was initially started on Ativan withdrawal taper for alcohol use d/o. Pt tolerated the taper well. He was also started on Abilify which was titrated to 10 mg qd. Remeron 7.5 mg qhs was added for insomnia/anxiety and Prazosin 1 mg qhs was added for nightmares.  Patient was compliant with the medications and denied any side-effects of the medications.

## 2020-06-17 NOTE — DISCHARGE NOTE BEHAVIORAL HEALTH - MEDICATION SUMMARY - MEDICATIONS TO TAKE
I will START or STAY ON the medications listed below when I get home from the hospital:    mirtazapine 7.5 mg oral tablet  -- 1 tab(s) by mouth once a day (at bedtime) x 14 days   -- Indication: For insomnia    ARIPiprazole 10 mg oral tablet  -- 1 tab(s) by mouth once a day x 14 days   -- Indication: For mood    hydrOXYzine hydrochloride 50 mg oral tablet  -- 1 tab(s) by mouth every 8 hours x 14 days, As Needed -anxiety/insomnia   -- Indication: For Anxiety    nicotine 21 mg/24 hr transdermal film, extended release  -- 1 patch by transdermal patch once a day for nicotine craving. Continue to take until told otherwise by your provider.   -- Indication: For nicotine craving    Multiple Vitamins oral tablet  -- 1 tab(s) by mouth once a day. Continue to take until told otherwise by your provider.   -- Indication: For vitamin supplement    folic acid 1 mg oral tablet  -- 1 tab(s) by mouth once a day x 14 days   -- Indication: For vitamin supplement    thiamine 100 mg oral tablet  -- 1 tab(s) by mouth once a day x 14 days   -- Indication: For vitamin supplement

## 2020-06-17 NOTE — DISCHARGE NOTE BEHAVIORAL HEALTH - MEDICATION SUMMARY - MEDICATIONS TO STOP TAKING
I will STOP taking the medications listed below when I get home from the hospital:    Wellbutrin    acetaminophen 325 mg oral tablet  -- 2 tab(s) by mouth every 6 hours    oxyCODONE 5 mg oral tablet  -- 1 tab(s) by mouth every 6 hours, As Needed -for moderate pain MDD:MDD=4   -- Caution federal law prohibits the transfer of this drug to any person other  than the person for whom it was prescribed.  It is very important that you take or use this exactly as directed.  Do not skip doses or discontinue unless directed by your doctor.  May cause drowsiness.  Alcohol may intensify this effect.  Use care when operating dangerous machinery.  This prescription cannot be refilled.  Using more of this medication than prescribed may cause serious breathing problems.    levETIRAcetam 500 mg oral tablet  -- 1 tab(s) by mouth 2 times a day   -- Check with your doctor before becoming pregnant.  It is very important that you take or use this exactly as directed.  Do not skip doses or discontinue unless directed by your doctor.  May cause drowsiness or dizziness.  Obtain medical advice before taking any non-prescription drugs as some may affect the action of this medication.  Swallow whole.  Do not crush.  This drug may impair the ability to drive or operate machinery.  Use care until you become familiar with its effects.

## 2020-06-17 NOTE — PROGRESS NOTE BEHAVIORAL HEALTH - NSBHFUPINTERVALHXFT_PSY_A_CORE
Pt was seen, evaluated, chart reviewed.  As per nursing staff, no events overnight. On evaluation, pt reports that he is doing "good" on the unit. Denied withdrawal symptoms and denied any alcohol cravings. Is compliant with medication, denies negative side effects. Endorsed good appetite. States that he took remeron last night and he fell asleep but woke up shortly and felt that it was a "nap." States then he finally fell asleep at 1AM until the morning. Complains of nightmares. Denied A/V hallucinations. Denied paranoia. Denied suicidal/homicidal ideation, intent or plan. Pt was seen, evaluated, chart reviewed.  As per nursing staff, no events overnight. On evaluation, pt reports that he is doing "good" on the unit. Denied withdrawal symptoms and denied any alcohol cravings. Is compliant with medication, denies negative side effects. Endorsed good appetite. States that he took remeron last night and he fell asleep but woke up shortly and felt that it was a "nap." States then he finally fell asleep at 1AM until the morning. Complains of nightmares. Otherwise, pt is future oriented. Has been attending groups on the unit. States that he will be attending outdoor alcohol support groups once he leaves the hospital. States that he "let himself go" and wants to go back to working out. Reports that his son expressed interest in learning about working out so he will be doing it with his son. Denied A/V hallucinations. Denied paranoia. Denied suicidal/homicidal ideation, intent or plan.    Physician spoke with pt's wife, she remains on board regarding pt's discharge tomorrow. Has no safety concerns, will pick him up from the hospital.

## 2020-06-17 NOTE — PROGRESS NOTE BEHAVIORAL HEALTH - SUMMARY
Patient is a 50 yo  male; domiciled with wife and 3 children; on disability previously  and ; medical hx of asthma and hx of SAH in Jan 2020; psych hx of bipolar disorder with psychotic features and PSTD (9/11  as [employer]); no prior hospitalizations; no prior SA or NSSIB; substance use significant for alcohol use disorder with hx of rehab/detoxs; who was sent by his outpatient psychiatrist, Dr. Pereyra for evaluation.  , Utox negative on presentation.    Bipolar d/o  - Continue Abilify 10 mg qd   - Remeron 7.5 mg qhs for insomnia  - Will try prazosin 1 mg qhs for nightmares    Alcohol use d/o  - MVI/folate/thiamine  - MOCA 26/30    Alcohol withdrawal  - Monitor for alcohol withdrawal symptoms; no withdrawal symptoms > 24 hrs  - Completed Ativan taper 6/16/20

## 2020-06-17 NOTE — PROGRESS NOTE BEHAVIORAL HEALTH - NSBHCHARTREVIEWVS_PSY_A_CORE FT
ICU Vital Signs Last 24 Hrs  T(C): 36 (16 Jun 2020 08:44), Max: 36 (16 Jun 2020 05:52)  T(F): 96.8 (16 Jun 2020 08:44), Max: 96.8 (16 Jun 2020 05:52)  HR: 131 (16 Jun 2020 08:44) (74 - 131)  BP: 101/75 (16 Jun 2020 08:44) (101/58 - 124/89)  BP(mean): --  ABP: --  ABP(mean): --  RR: 17 (16 Jun 2020 08:44) (15 - 19)  SpO2: --
ICU Vital Signs Last 24 Hrs  T(C): 36.2 (17 Jun 2020 07:56), Max: 36.2 (17 Jun 2020 07:56)  T(F): 97.1 (17 Jun 2020 07:56), Max: 97.1 (17 Jun 2020 07:56)  HR: 96 (17 Jun 2020 07:56) (78 - 97)  BP: 127/76 (17 Jun 2020 07:56) (110/70 - 127/76)  BP(mean): --  ABP: --  ABP(mean): --  RR: 16 (17 Jun 2020 07:56) (16 - 18)  SpO2: --
ICU Vital Signs Last 24 Hrs  T(C): 36.8 (13 Jun 2020 16:00), Max: 36.8 (13 Jun 2020 16:00)  T(F): 98.2 (13 Jun 2020 16:00), Max: 98.2 (13 Jun 2020 16:00)  HR: 103 (13 Jun 2020 16:00) (78 - 103)  BP: 108/63 (13 Jun 2020 16:00) (108/63 - 128/97)  BP(mean): --  ABP: --  ABP(mean): --  RR: 16 (13 Jun 2020 16:00) (16 - 18)  SpO2: --
ICU Vital Signs Last 24 Hrs  T(C): 37.1 (15 Damian 2020 08:48), Max: 37.1 (15 Damian 2020 08:48)  T(F): 98.7 (15 Damian 2020 08:48), Max: 98.7 (15 Damian 2020 08:48)  HR: 120 (15 Damian 2020 08:48) (70 - 120)  BP: 125/75 (15 Damian 2020 08:48) (123/82 - 128/81)  BP(mean): --  ABP: --  ABP(mean): --  RR: 18 (15 Damian 2020 08:48) (16 - 18)  SpO2: --
Vital Signs Last 24 Hrs  T(C): 35.8 (14 Jun 2020 08:47), Max: 36 (14 Jun 2020 06:07)  T(F): 96.5 (14 Jun 2020 08:47), Max: 96.8 (14 Jun 2020 06:07)  HR: 114 (14 Jun 2020 08:47) (114 - 115)  BP: 120/86 (14 Jun 2020 08:47) (115/84 - 120/86)  BP(mean): --  RR: 16 (14 Jun 2020 08:47) (16 - 19)  SpO2: --
ICU Vital Signs Last 24 Hrs  T(C): 35.8 (12 Jun 2020 11:18), Max: 37.6 (12 Jun 2020 07:17)  T(F): 96.5 (12 Jun 2020 11:18), Max: 99.6 (12 Jun 2020 07:17)  HR: 103 (12 Jun 2020 11:18) (73 - 103)  BP: 140/73 (12 Jun 2020 11:18) (126/76 - 155/115)  BP(mean): --  ABP: --  ABP(mean): --  RR: 18 (12 Jun 2020 11:18) (18 - 20)  SpO2: 95% (12 Jun 2020 10:37) (95% - 97%)

## 2020-06-17 NOTE — PROGRESS NOTE ADULT - SUBJECTIVE AND OBJECTIVE BOX
pt stable alert in NAD  no new complaints    PSYCHOSIS ALCOHOL ABUSE  ^CONFUSION  Handoff  MEWS Score  Asthma, unspecified asthma severity, unspecified whether complicated, unspecified whether persistent  PTSD (post-traumatic stress disorder)  Psychosis  Alcohol withdrawal syndrome without complication  Alcohol use disorder, moderate, dependence  PTSD (post-traumatic stress disorder)  Bipolar disorder with psychotic features  No significant past surgical history  CONFUSION  90+  Alcohol abuse    HEALTH ISSUES - PROBLEM Dx:  Alcohol withdrawal syndrome without complication  Alcohol use disorder, moderate, dependence  PTSD (post-traumatic stress disorder)  Bipolar disorder with psychotic features        PAST MEDICAL & SURGICAL HISTORY:  Asthma, unspecified asthma severity, unspecified whether complicated, unspecified whether persistent  PTSD (post-traumatic stress disorder)  No significant past surgical history    No Known Allergies      FAMILY HISTORY:      ARIPiprazole 10 milliGRAM(s) Oral daily  folic acid 1 milliGRAM(s) Oral daily  hydrOXYzine hydrochloride 50 milliGRAM(s) Oral every 6 hours PRN  mirtazapine 7.5 milliGRAM(s) Oral at bedtime  multivitamin 1 Tablet(s) Oral daily  nicotine  Polacrilex Gum 2 milliGRAM(s) Oral three times a day PRN  nicotine - 21 mG/24Hr(s) Patch 1 patch Transdermal daily  thiamine 100 milliGRAM(s) Oral daily      T(C): 36.2 (06-17-20 @ 07:56), Max: 36.2 (06-17-20 @ 07:56)  HR: 96 (06-17-20 @ 07:56) (78 - 131)  BP: 127/76 (06-17-20 @ 07:56) (101/75 - 127/76)  RR: 16 (06-17-20 @ 07:56) (16 - 18)  SpO2: --    PE;  general: no acute changes ntoed in nad    Lungs:    Heart:    EXT:    Neuro:  aelrt nod efcits                          CAPILLARY BLOOD GLUCOSE

## 2020-06-17 NOTE — DISCHARGE NOTE BEHAVIORAL HEALTH - PAST PSYCHIATRIC HISTORY
In treatment with Dr. Pereyra.   Denied previous hospitalizations. Denied previous suicide attempts.     As per [facility] pharmacy: Pt is on Lexapro 5 mg qd, Abilify 7.5 mg qd, naltrexone 50 mg qd, Vistaril 50 mg BID, Trileptal 300 mg TID. Was also on Buspar 15 mg TID in Jan 2020.

## 2020-06-17 NOTE — PROGRESS NOTE BEHAVIORAL HEALTH - NSBHLEGALSTATUS_PSY_A_CORE
9.39 (Emergency)

## 2020-06-17 NOTE — PROGRESS NOTE BEHAVIORAL HEALTH - SECONDARY DX3
Alcohol withdrawal syndrome without complication

## 2020-06-17 NOTE — PROGRESS NOTE BEHAVIORAL HEALTH - PRIMARY DX
Bipolar disorder with psychotic features

## 2020-06-17 NOTE — PROGRESS NOTE BEHAVIORAL HEALTH - SECONDARY DX2
Alcohol use disorder, moderate, dependence

## 2020-06-17 NOTE — DISCHARGE NOTE BEHAVIORAL HEALTH - NSTOBACCOREFERRAL_GEN_A_NCS
Yes Patient registered and referred to the [place] [employer] program. Phone appointment scheduled for 6/20/2020 at 0900/Yes

## 2020-06-18 VITALS
DIASTOLIC BLOOD PRESSURE: 79 MMHG | TEMPERATURE: 98 F | SYSTOLIC BLOOD PRESSURE: 135 MMHG | RESPIRATION RATE: 18 BRPM | HEART RATE: 102 BPM

## 2020-06-18 PROCEDURE — 99238 HOSP IP/OBS DSCHRG MGMT 30/<: CPT

## 2020-06-18 RX ORDER — HYDROXYZINE HCL 10 MG
1 TABLET ORAL
Qty: 0 | Refills: 0 | DISCHARGE
Start: 2020-06-18

## 2020-06-18 RX ORDER — MIRTAZAPINE 45 MG/1
1 TABLET, ORALLY DISINTEGRATING ORAL
Qty: 0 | Refills: 0 | DISCHARGE
Start: 2020-06-18

## 2020-06-18 RX ORDER — FOLIC ACID 0.8 MG
1 TABLET ORAL
Qty: 14 | Refills: 0
Start: 2020-06-18 | End: 2020-07-01

## 2020-06-18 RX ORDER — NICOTINE POLACRILEX 2 MG
1 GUM BUCCAL
Qty: 0 | Refills: 0 | DISCHARGE
Start: 2020-06-18

## 2020-06-18 RX ORDER — ARIPIPRAZOLE 15 MG/1
1 TABLET ORAL
Qty: 0 | Refills: 0 | DISCHARGE
Start: 2020-06-18

## 2020-06-18 RX ORDER — ARIPIPRAZOLE 15 MG/1
1 TABLET ORAL
Qty: 14 | Refills: 0
Start: 2020-06-18 | End: 2020-07-01

## 2020-06-18 RX ORDER — MIRTAZAPINE 45 MG/1
1 TABLET, ORALLY DISINTEGRATING ORAL
Qty: 14 | Refills: 0
Start: 2020-06-18 | End: 2020-07-01

## 2020-06-18 RX ORDER — HYDROXYZINE HCL 10 MG
1 TABLET ORAL
Qty: 42 | Refills: 0
Start: 2020-06-18 | End: 2020-07-01

## 2020-06-18 RX ORDER — THIAMINE MONONITRATE (VIT B1) 100 MG
1 TABLET ORAL
Qty: 14 | Refills: 0
Start: 2020-06-18 | End: 2020-07-01

## 2020-06-18 RX ADMIN — Medication 1 MILLIGRAM(S): at 08:20

## 2020-06-18 RX ADMIN — Medication 1 PATCH: at 07:25

## 2020-06-18 RX ADMIN — Medication 1 PATCH: at 08:20

## 2020-06-18 RX ADMIN — Medication 1 TABLET(S): at 08:21

## 2020-06-18 RX ADMIN — Medication 100 MILLIGRAM(S): at 08:20

## 2020-06-18 RX ADMIN — ARIPIPRAZOLE 10 MILLIGRAM(S): 15 TABLET ORAL at 08:20

## 2020-06-18 RX ADMIN — Medication 1 PATCH: at 08:21

## 2020-06-18 NOTE — CHART NOTE - NSCHARTNOTEFT_GEN_A_CORE
Social Work Discharge Note:    Patient is for discharge today.   He is alert and oriented x3.  Mood is improved.  Anxiety has decreased.  Insight and judgment have improved.  Suicidal / homicidal ideation denied.      Patient will be discharged to his home in [place] with his family.  Plan is for patient to resume outpatient mental health treatment with his private psychiatrist.  He is aware and agreeable to same.      [facility] [phone] provided as an additional resource.     Contact with patient’s spouse occurred prior to discharge.  At that time no concerns were verbalized regarding patient’s return to the community.     Patient is aware and agreeable to discharge today.

## 2020-06-18 NOTE — PROGRESS NOTE ADULT - SUBJECTIVE AND OBJECTIVE BOX
pt stable alert in NAD  no new complaints    PSYCHOSIS ALCOHOL ABUSE  ^CONFUSION  Handoff  MEWS Score  Asthma, unspecified asthma severity, unspecified whether complicated, unspecified whether persistent  PTSD (post-traumatic stress disorder)  Bipolar disorder with psychotic features  Psychosis  Alcohol withdrawal syndrome without complication  Alcohol use disorder, moderate, dependence  PTSD (post-traumatic stress disorder)  Bipolar disorder with psychotic features  No significant past surgical history  CONFUSION  90+  PTSD (post-traumatic stress disorder)  Alcohol use disorder, moderate, dependence  Alcohol abuse    HEALTH ISSUES - PROBLEM Dx:  Alcohol withdrawal syndrome without complication  Alcohol use disorder, moderate, dependence  PTSD (post-traumatic stress disorder)  Bipolar disorder with psychotic features        PAST MEDICAL & SURGICAL HISTORY:  Asthma, unspecified asthma severity, unspecified whether complicated, unspecified whether persistent  PTSD (post-traumatic stress disorder)  No significant past surgical history    No Known Allergies      FAMILY HISTORY:      ARIPiprazole 10 milliGRAM(s) Oral daily  folic acid 1 milliGRAM(s) Oral daily  hydrOXYzine hydrochloride 50 milliGRAM(s) Oral every 6 hours PRN  mirtazapine 7.5 milliGRAM(s) Oral at bedtime  multivitamin 1 Tablet(s) Oral daily  nicotine  Polacrilex Gum 2 milliGRAM(s) Oral three times a day PRN  nicotine - 21 mG/24Hr(s) Patch 1 patch Transdermal daily  thiamine 100 milliGRAM(s) Oral daily      T(C): 35.6 (06-18-20 @ 06:14), Max: 35.6 (06-18-20 @ 06:14)  HR: 75 (06-18-20 @ 06:14) (75 - 75)  BP: 112/72 (06-18-20 @ 06:14) (112/72 - 112/72)  RR: 16 (06-18-20 @ 06:14) (16 - 16)  SpO2: --    PE;  general:  no acute cahnges in nad    Lungs:    Heart:    EXT:    Neuro:  no deficits                          CAPILLARY BLOOD GLUCOSE

## 2020-06-21 DIAGNOSIS — F43.10 POST-TRAUMATIC STRESS DISORDER, UNSPECIFIED: ICD-10-CM

## 2020-06-21 DIAGNOSIS — J45.909 UNSPECIFIED ASTHMA, UNCOMPLICATED: ICD-10-CM
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
## 2020-06-21 DIAGNOSIS — F29 UNSPECIFIED PSYCHOSIS NOT DUE TO A SUBSTANCE OR KNOWN PHYSIOLOGICAL CONDITION: ICD-10-CM

## 2020-06-21 DIAGNOSIS — F31.5 BIPOLAR DISORDER, CURRENT EPISODE DEPRESSED, SEVERE, WITH PSYCHOTIC FEATURES: ICD-10-CM

## 2020-06-21 DIAGNOSIS — Z91.19 PATIENT'S NONCOMPLIANCE WITH OTHER MEDICAL TREATMENT AND REGIMEN: ICD-10-CM

## 2020-06-21 DIAGNOSIS — F10.239 ALCOHOL DEPENDENCE WITH WITHDRAWAL, UNSPECIFIED: ICD-10-CM

## 2021-03-09 NOTE — PROGRESS NOTE BEHAVIORAL HEALTH - NSBHFUPVIOL_PSY_A_CORE
Access: RFV 7Fr, RFA 6Fr->12 Fr  Sat(%): MV 83, Ao 100 hyperdynamic CI of 5.5 L/min/m2 with no intracardiac shunting.   Pressure(mmHg):
Access: RFV 7Fr, RFA 6Fr->12 Fr  Sat(%): MV 83, Ao 100 hyperdynamic CI of 5.5 L/min/m2 w no shunt.   Pressure(mmHg): nl right heart filling pressure. nl LPCW=LVEDp=8. No gradient across LVOT with gradient of ~18 from Ao to Maryuri.   Angios: Narrow segment of co-arctation of aorta with minimum diameter of ~9 mm with post stenotic dilation and Maryuri measuring ~12-13 mm.
Access: RFV 7Fr, RFA 6Fr->12 Fr  Sat(%): MV 83, Ao 100 hyperdynamic CI of 5.5 L/min/m2 w no shunt.   Pressure(mmHg): nl LPCW=LVEDp=8. No gradient across LVOT with gradient of ~18 from Ao to Maryuri.   Angios: Narrow segment of co-arctation of aorta w origin of left subclavian artery from the co-arc segment. Minimum diameter of ~9 mm across the narrowing with post stenotic dilation w Maryuri measuring ~12-13 mm. Placement of EV3 over 14 mm BIB balloon across the narrow segment. Post interv angio showed contained intimal tear of Maryuri, therefore covered stent placed over 18 mm balloon distal from the origin of the left subclavian artery. Post procedure minimal residual (3-4 mmHg) gradient across stented coarc segment w stable contained intimal tear.   A/P: 12 year old with native co-arctation of the aorta s/p stent placement.   Plan:  - Admit to PICU for o/n observation.   - Goal SBP<110 mmHg. Consider Esmolol if unable to control high SBP.   - 2 add'nl doses of antibiotics  - EKG today  - Consider CT chest if there is progressive or severe chest/back pain.   - Echo in AM  - No anticoagulation   - Lie flat for 4 hours. Safeguard in place to be removed prior d/c.   - Above shared w PICU, primary cards and parents.
Access: RFV 7Fr, RFA 6Fr->12 Fr  Sat(%): MV 83, Ao 100 hyperdynamic CI of 5.5 L/min/m2 w no shunt.   Pressure(mmHg): nl LPCW=LVEDp=8. No gradient across LVOT with gradient of ~18 from Ao to Maryuri.   Angios: Narrow segment of co-arctation of aorta w origin of left subclavian artery from the co-arc segment. Minimum diameter of ~9 mm across the narrowing with post stenotic dilation w Maryuri measuring ~12-13 mm at the level of diaphragm. Placement of EV3 over 14 mm BIB balloon across the narrow segment. Post interv angio showed contained intimal tear of Maryuri, therefore covered stent placed over 18 mm balloon distal from the origin of the left subclavian artery. Post procedure minimal residual (3-4 mmHg) gradient across stented coarc segment w stable contained intimal tear.   A/P: 12 year old with native co-arctation of the aorta s/p stent placement.   Plan:  - Admit to PICU for o/n observation.   - Goal SBP<110 mmHg. Consider Esmolol if unable to control high SBP.   - 2 add'nl doses of antibiotics  - EKG today  - Consider CT chest if there is progressive or severe chest/back pain.   - Echo in AM  - No anticoagulation   - Lie flat for 4 hours. Safeguard in place to be removed prior d/c.   - Above shared w PICU, primary cards and parents.
Access: RFV 7Fr, RFA 6Fr->12 Fr  Sat(%): MV 83, Ao 100 hyperdynamic CI of 5.5 L/min/m2 w no shunt.   Pressure (mmHg): nl LPCW=LVEDp=8. No gradient across LVOT with gradient of ~18 from AscAo to Maryuri.   Angios: Narrow segment of coarctation of aorta w origin of left subclavian artery from the narrow segment. Minimum diameter of ~9 mm across the narrowing with post stenotic dilation w Maryuri measuring ~12-13 mm at the level of diaphragm. EV3 36Max LD Intrasent placed on 14 mm BIB balloon across the narrow segment. Angio showed contained intimal tear of aMryuri as distal margin of narrowed segment; pre-mounted 14mmx 3.4cm covered stent placed telescoping within initial stent just distal to origin of LSCA; distal portion of stent flared with 18mm Tyshak II. Post procedure minimal residual (3-4 mmHg) gradient across stented coarct segment w minimal residual extravasation of contrast seen at site of initial intimal tear.   A/P: 12 year old with native coarctation of the aorta s/p stent x2 placement.   - Admit to PICU for monitoring due to risk of further aortic dissection   - Goal SBP<110 mmHg; titrate esmolol if needed for adequate BP control  - Notify cardiology & obtain CT angiogram for significant non-muscular chest/back pain.    - 2 add'nl doses of antibiotics  - EKG today  - Echo in AM  - No OOB for 5 hours (flat 2h, 15 degrees 2h). Safeguard in place (cards will deflate per protocol)  - Above shared w PICU, primary cards and parent.
none known

## 2021-12-15 ENCOUNTER — APPOINTMENT (OUTPATIENT)
Dept: NEUROPSYCHOLOGY | Facility: CLINIC | Age: 51
End: 2021-12-15

## 2022-02-02 ENCOUNTER — APPOINTMENT (OUTPATIENT)
Dept: NEUROPSYCHOLOGY | Facility: CLINIC | Age: 52
End: 2022-02-02

## 2022-02-14 ENCOUNTER — APPOINTMENT (OUTPATIENT)
Dept: NEUROPSYCHOLOGY | Facility: CLINIC | Age: 52
End: 2022-02-14

## 2022-02-16 ENCOUNTER — APPOINTMENT (OUTPATIENT)
Dept: NEUROPSYCHOLOGY | Facility: CLINIC | Age: 52
End: 2022-02-16

## 2022-03-02 ENCOUNTER — APPOINTMENT (OUTPATIENT)
Dept: NEUROPSYCHOLOGY | Facility: CLINIC | Age: 52
End: 2022-03-02

## 2022-03-14 ENCOUNTER — APPOINTMENT (OUTPATIENT)
Dept: NEUROLOGY | Facility: CLINIC | Age: 52
End: 2022-03-14

## 2022-04-04 ENCOUNTER — APPOINTMENT (OUTPATIENT)
Dept: NEUROPSYCHOLOGY | Facility: CLINIC | Age: 52
End: 2022-04-04

## 2022-04-04 ENCOUNTER — OUTPATIENT (OUTPATIENT)
Dept: OUTPATIENT SERVICES | Facility: HOSPITAL | Age: 52
LOS: 1 days | Discharge: HOME | End: 2022-04-04

## 2022-04-04 DIAGNOSIS — R41.3 OTHER AMNESIA: ICD-10-CM

## 2024-01-11 ENCOUNTER — APPOINTMENT (OUTPATIENT)
Dept: NEUROLOGY | Facility: CLINIC | Age: 54
End: 2024-01-11
Payer: MEDICARE

## 2024-01-11 DIAGNOSIS — F09 UNSPECIFIED MENTAL DISORDER DUE TO KNOWN PHYSIOLOGICAL CONDITION: ICD-10-CM

## 2024-01-11 PROCEDURE — 99204 OFFICE O/P NEW MOD 45 MIN: CPT

## 2024-01-12 RX ORDER — OLANZAPINE 10 MG/1
10 TABLET, FILM COATED ORAL DAILY
Refills: 0 | Status: ACTIVE | COMMUNITY

## 2024-01-12 RX ORDER — LURASIDONE HYDROCHLORIDE 60 MG/1
60 TABLET, FILM COATED ORAL DAILY
Refills: 0 | Status: ACTIVE | COMMUNITY

## 2024-01-12 RX ORDER — SERTRALINE 25 MG/1
25 TABLET, FILM COATED ORAL DAILY
Refills: 0 | Status: ACTIVE | COMMUNITY

## 2024-01-12 NOTE — HISTORY OF PRESENT ILLNESS
[FreeTextEntry1] : Mr. Rodriguez is a 54yo man with history of traumatic head injury with SAH, ETOH abuse, anxiety, depression, PTSD here for evaluation of cognitive issues and balance issues.  He has seen neurologist previously and is following with psychiatry.  He is on multiple psych medications and has poor concertation, attention which is likely affecting his memory significantly.  He also has signficant balance issues likely related to continued alcohol use (had 3 drinks last night). As per wife he has frequent falls where he loses balance.  He is not active and has no motivation to do activities throughout the day.  He is mostly watching tv and sleeping. He used to ride his bike for miles each day and exercise regularly. He had neuropsych testing done which showed neurocogntive deficits with significant portion being related to concentration and attention.  Felt to be mixed causes including post concussion, Traumatic head injury, ETOH abuse, Depression and anxiety

## 2024-01-12 NOTE — PHYSICAL EXAM
[FreeTextEntry1] : MS: Awake, alert, oriented to person, place, situation and time.  Follows commands.   Language: Speech is clear, fluent. No dysarthria.   CNs 2 - 12 intact. EOMI no nystagmus, no diplopia. No facial asymmetry b/l. Tongue midline, normal movements, no atrophy.  Motor: Normal muscle bulk & tone. No noticeable tremor or seizure. No pronator drift. Muscle strength of b/l UE and b/l LE 5/5. Tremor in both hands and head tremor visibile   Sensation: Intact to LT b/l throughout  Cortical: No extinction  Coordination: Intact rapid-alternating movements. No dysmetria to FTN.   DTR: 2+ in biceps, brachioradialis and triceps; 1+ in patellar and ankle; plantars are down b/l  Gait: No postural instability. Normal stance and tandem gait.  General: Alert and oriented to person, place, time, and situation. In no acute distress. Cooperative. Follows commands.  Eyes: Sclera and conjunctiva were normal and pupils were equal in size, round, reactive to light, with normal accommodation ENT: Ears and nose normal in appearance.  Vascular: No peripheral edema. Respiratory: No visible respiratory distress.  Musculoskeletal: No involuntary movements were seen. Skin: Normal skin color and pigmentation.

## 2024-02-04 NOTE — PROGRESS NOTE BEHAVIORAL HEALTH - BEHAVIOR
Pt stated she was bitten by her dog on her right foot  approximately 30 mins ago c/o pain to right foot Cooperative/Other Cooperative

## 2024-02-12 ENCOUNTER — APPOINTMENT (OUTPATIENT)
Dept: NEUROLOGY | Facility: CLINIC | Age: 54
End: 2024-02-12

## 2024-02-14 ENCOUNTER — APPOINTMENT (OUTPATIENT)
Dept: NEUROLOGY | Facility: CLINIC | Age: 54
End: 2024-02-14

## 2024-06-17 ENCOUNTER — APPOINTMENT (OUTPATIENT)
Dept: NEUROLOGY | Facility: CLINIC | Age: 54
End: 2024-06-17

## 2024-07-11 ENCOUNTER — INPATIENT (INPATIENT)
Facility: HOSPITAL | Age: 54
LOS: 6 days | Discharge: HOME | DRG: 897 | End: 2024-07-18
Attending: STUDENT IN AN ORGANIZED HEALTH CARE EDUCATION/TRAINING PROGRAM | Admitting: INTERNAL MEDICINE
Payer: MEDICARE

## 2024-07-11 VITALS
RESPIRATION RATE: 18 BRPM | OXYGEN SATURATION: 96 % | SYSTOLIC BLOOD PRESSURE: 114 MMHG | DIASTOLIC BLOOD PRESSURE: 79 MMHG | TEMPERATURE: 98 F | HEIGHT: 72 IN | HEART RATE: 96 BPM | WEIGHT: 164.91 LBS

## 2024-07-11 DIAGNOSIS — F10.239 ALCOHOL DEPENDENCE WITH WITHDRAWAL, UNSPECIFIED: ICD-10-CM

## 2024-07-11 LAB
ALBUMIN SERPL ELPH-MCNC: 4.1 G/DL — SIGNIFICANT CHANGE UP (ref 3.5–5.2)
ALP SERPL-CCNC: 98 U/L — SIGNIFICANT CHANGE UP (ref 30–115)
ALT FLD-CCNC: 64 U/L — HIGH (ref 0–41)
AMMONIA BLD-MCNC: 42 UMOL/L — SIGNIFICANT CHANGE UP (ref 11–55)
ANION GAP SERPL CALC-SCNC: 14 MMOL/L — SIGNIFICANT CHANGE UP (ref 7–14)
APPEARANCE UR: ABNORMAL
APTT BLD: 31.5 SEC — SIGNIFICANT CHANGE UP (ref 27–39.2)
AST SERPL-CCNC: 107 U/L — HIGH (ref 0–41)
BASOPHILS # BLD AUTO: 0.03 K/UL — SIGNIFICANT CHANGE UP (ref 0–0.2)
BASOPHILS NFR BLD AUTO: 0.5 % — SIGNIFICANT CHANGE UP (ref 0–1)
BILIRUB DIRECT SERPL-MCNC: 0.5 MG/DL — HIGH (ref 0–0.3)
BILIRUB INDIRECT FLD-MCNC: 1 MG/DL — SIGNIFICANT CHANGE UP (ref 0.2–1.2)
BILIRUB SERPL-MCNC: 1.5 MG/DL — HIGH (ref 0.2–1.2)
BILIRUB UR-MCNC: NEGATIVE — SIGNIFICANT CHANGE UP
BUN SERPL-MCNC: 9 MG/DL — LOW (ref 10–20)
CALCIUM SERPL-MCNC: 9.9 MG/DL — SIGNIFICANT CHANGE UP (ref 8.4–10.5)
CHLORIDE SERPL-SCNC: 100 MMOL/L — SIGNIFICANT CHANGE UP (ref 98–110)
CO2 SERPL-SCNC: 26 MMOL/L — SIGNIFICANT CHANGE UP (ref 17–32)
COLOR SPEC: YELLOW — SIGNIFICANT CHANGE UP
CREAT SERPL-MCNC: 0.7 MG/DL — SIGNIFICANT CHANGE UP (ref 0.7–1.5)
DIFF PNL FLD: NEGATIVE — SIGNIFICANT CHANGE UP
EGFR: 111 ML/MIN/1.73M2 — SIGNIFICANT CHANGE UP
EOSINOPHIL # BLD AUTO: 0.01 K/UL — SIGNIFICANT CHANGE UP (ref 0–0.7)
EOSINOPHIL NFR BLD AUTO: 0.2 % — SIGNIFICANT CHANGE UP (ref 0–8)
ETHANOL SERPL-MCNC: <10 MG/DL — SIGNIFICANT CHANGE UP
GAS PNL BLDV: SIGNIFICANT CHANGE UP
GLUCOSE SERPL-MCNC: 114 MG/DL — HIGH (ref 70–99)
GLUCOSE UR QL: NEGATIVE MG/DL — SIGNIFICANT CHANGE UP
HCT VFR BLD CALC: 38 % — LOW (ref 42–52)
HGB BLD-MCNC: 12.8 G/DL — LOW (ref 14–18)
IMM GRANULOCYTES NFR BLD AUTO: 0.3 % — SIGNIFICANT CHANGE UP (ref 0.1–0.3)
INR BLD: 1.13 RATIO — SIGNIFICANT CHANGE UP (ref 0.65–1.3)
KETONES UR-MCNC: NEGATIVE MG/DL — SIGNIFICANT CHANGE UP
LEUKOCYTE ESTERASE UR-ACNC: NEGATIVE — SIGNIFICANT CHANGE UP
LIDOCAIN IGE QN: 49 U/L — SIGNIFICANT CHANGE UP (ref 7–60)
LYMPHOCYTES # BLD AUTO: 0.81 K/UL — LOW (ref 1.2–3.4)
LYMPHOCYTES # BLD AUTO: 12.3 % — LOW (ref 20.5–51.1)
MAGNESIUM SERPL-MCNC: 1.4 MG/DL — LOW (ref 1.8–2.4)
MAGNESIUM SERPL-MCNC: 1.4 MG/DL — LOW (ref 1.8–2.4)
MCHC RBC-ENTMCNC: 33.2 PG — HIGH (ref 27–31)
MCHC RBC-ENTMCNC: 33.7 G/DL — SIGNIFICANT CHANGE UP (ref 32–37)
MCV RBC AUTO: 98.7 FL — HIGH (ref 80–94)
MONOCYTES # BLD AUTO: 0.67 K/UL — HIGH (ref 0.1–0.6)
MONOCYTES NFR BLD AUTO: 10.2 % — HIGH (ref 1.7–9.3)
NEUTROPHILS # BLD AUTO: 5.05 K/UL — SIGNIFICANT CHANGE UP (ref 1.4–6.5)
NEUTROPHILS NFR BLD AUTO: 76.5 % — HIGH (ref 42.2–75.2)
NITRITE UR-MCNC: NEGATIVE — SIGNIFICANT CHANGE UP
NRBC # BLD: 0 /100 WBCS — SIGNIFICANT CHANGE UP (ref 0–0)
PH UR: 7 — SIGNIFICANT CHANGE UP (ref 5–8)
PLATELET # BLD AUTO: 70 K/UL — LOW (ref 130–400)
PMV BLD: 10.8 FL — HIGH (ref 7.4–10.4)
POTASSIUM SERPL-MCNC: 3.5 MMOL/L — SIGNIFICANT CHANGE UP (ref 3.5–5)
POTASSIUM SERPL-SCNC: 3.5 MMOL/L — SIGNIFICANT CHANGE UP (ref 3.5–5)
PROT SERPL-MCNC: 7.5 G/DL — SIGNIFICANT CHANGE UP (ref 6–8)
PROT UR-MCNC: NEGATIVE MG/DL — SIGNIFICANT CHANGE UP
PROTHROM AB SERPL-ACNC: 12.9 SEC — HIGH (ref 9.95–12.87)
RBC # BLD: 3.85 M/UL — LOW (ref 4.7–6.1)
RBC # FLD: 16 % — HIGH (ref 11.5–14.5)
SODIUM SERPL-SCNC: 140 MMOL/L — SIGNIFICANT CHANGE UP (ref 135–146)
SP GR SPEC: 1.01 — SIGNIFICANT CHANGE UP (ref 1–1.03)
UROBILINOGEN FLD QL: 1 MG/DL — SIGNIFICANT CHANGE UP (ref 0.2–1)
WBC # BLD: 6.59 K/UL — SIGNIFICANT CHANGE UP (ref 4.8–10.8)
WBC # FLD AUTO: 6.59 K/UL — SIGNIFICANT CHANGE UP (ref 4.8–10.8)

## 2024-07-11 PROCEDURE — 70551 MRI BRAIN STEM W/O DYE: CPT | Mod: MC

## 2024-07-11 PROCEDURE — 82977 ASSAY OF GGT: CPT

## 2024-07-11 PROCEDURE — 80076 HEPATIC FUNCTION PANEL: CPT

## 2024-07-11 PROCEDURE — 71045 X-RAY EXAM CHEST 1 VIEW: CPT | Mod: 26

## 2024-07-11 PROCEDURE — 85025 COMPLETE CBC W/AUTO DIFF WBC: CPT

## 2024-07-11 PROCEDURE — 80074 ACUTE HEPATITIS PANEL: CPT

## 2024-07-11 PROCEDURE — 80048 BASIC METABOLIC PNL TOTAL CA: CPT

## 2024-07-11 PROCEDURE — 97162 PT EVAL MOD COMPLEX 30 MIN: CPT | Mod: GP

## 2024-07-11 PROCEDURE — 84100 ASSAY OF PHOSPHORUS: CPT

## 2024-07-11 PROCEDURE — 99285 EMERGENCY DEPT VISIT HI MDM: CPT | Mod: FS

## 2024-07-11 PROCEDURE — 80053 COMPREHEN METABOLIC PANEL: CPT

## 2024-07-11 PROCEDURE — 70450 CT HEAD/BRAIN W/O DYE: CPT | Mod: 26,MC

## 2024-07-11 PROCEDURE — 84425 ASSAY OF VITAMIN B-1: CPT

## 2024-07-11 PROCEDURE — 74177 CT ABD & PELVIS W/CONTRAST: CPT | Mod: 26,MC

## 2024-07-11 PROCEDURE — 36415 COLL VENOUS BLD VENIPUNCTURE: CPT

## 2024-07-11 PROCEDURE — 83735 ASSAY OF MAGNESIUM: CPT

## 2024-07-11 RX ORDER — CHLORDIAZEPOXIDE HYDROCHLORIDE 10 MG/1
25 CAPSULE ORAL EVERY 4 HOURS
Refills: 0 | Status: DISCONTINUED | OUTPATIENT
Start: 2024-07-12 | End: 2024-07-12

## 2024-07-11 RX ORDER — OLANZAPINE 2.5 MG/1
0 TABLET, FILM COATED ORAL
Qty: 0 | Refills: 0 | DISCHARGE

## 2024-07-11 RX ORDER — POTASSIUM CHLORIDE 600 MG/1
40 TABLET, FILM COATED, EXTENDED RELEASE ORAL ONCE
Refills: 0 | Status: COMPLETED | OUTPATIENT
Start: 2024-07-11 | End: 2024-07-11

## 2024-07-11 RX ORDER — HYDROXYZINE PAMOATE 50 MG/1
25 CAPSULE ORAL EVERY 6 HOURS
Refills: 0 | Status: DISCONTINUED | OUTPATIENT
Start: 2024-07-11 | End: 2024-07-18

## 2024-07-11 RX ORDER — MAGNESIUM SULFATE 100 %
2 POWDER (GRAM) MISCELLANEOUS ONCE
Refills: 0 | Status: COMPLETED | OUTPATIENT
Start: 2024-07-11 | End: 2024-07-11

## 2024-07-11 RX ORDER — MAGNESIUM, ALUMINUM HYDROXIDE 400-400
30 TABLET,CHEWABLE ORAL EVERY 4 HOURS
Refills: 0 | Status: DISCONTINUED | OUTPATIENT
Start: 2024-07-11 | End: 2024-07-18

## 2024-07-11 RX ORDER — ACETAMINOPHEN 325 MG
650 TABLET ORAL EVERY 6 HOURS
Refills: 0 | Status: DISCONTINUED | OUTPATIENT
Start: 2024-07-11 | End: 2024-07-11

## 2024-07-11 RX ORDER — NICOTINE POLACRILEX 2 MG/1
1 LOZENGE ORAL ONCE
Refills: 0 | Status: COMPLETED | OUTPATIENT
Start: 2024-07-11 | End: 2024-07-11

## 2024-07-11 RX ORDER — THIAMINE HCL 100 MG
100 TABLET ORAL ONCE
Refills: 0 | Status: COMPLETED | OUTPATIENT
Start: 2024-07-11 | End: 2024-07-11

## 2024-07-11 RX ORDER — CHLORDIAZEPOXIDE HYDROCHLORIDE 10 MG/1
CAPSULE ORAL
Refills: 0 | Status: DISCONTINUED | OUTPATIENT
Start: 2024-07-11 | End: 2024-07-12

## 2024-07-11 RX ORDER — NICOTINE POLACRILEX 2 MG/1
1 LOZENGE ORAL DAILY
Refills: 0 | Status: DISCONTINUED | OUTPATIENT
Start: 2024-07-12 | End: 2024-07-18

## 2024-07-11 RX ORDER — CHLORDIAZEPOXIDE HYDROCHLORIDE 10 MG/1
50 CAPSULE ORAL EVERY 4 HOURS
Refills: 0 | Status: DISCONTINUED | OUTPATIENT
Start: 2024-07-11 | End: 2024-07-12

## 2024-07-11 RX ORDER — OLANZAPINE 2.5 MG/1
10 TABLET, FILM COATED ORAL DAILY
Refills: 0 | Status: DISCONTINUED | OUTPATIENT
Start: 2024-07-11 | End: 2024-07-18

## 2024-07-11 RX ORDER — ENOXAPARIN SODIUM 100 MG/ML
40 INJECTION SUBCUTANEOUS EVERY 24 HOURS
Refills: 0 | Status: DISCONTINUED | OUTPATIENT
Start: 2024-07-11 | End: 2024-07-18

## 2024-07-11 RX ORDER — DIAZEPAM 10 MG/1
5 TABLET ORAL ONCE
Refills: 0 | Status: DISCONTINUED | OUTPATIENT
Start: 2024-07-11 | End: 2024-07-11

## 2024-07-11 RX ORDER — ONDANSETRON HYDROCHLORIDE 2 MG/ML
4 INJECTION INTRAMUSCULAR; INTRAVENOUS EVERY 8 HOURS
Refills: 0 | Status: DISCONTINUED | OUTPATIENT
Start: 2024-07-11 | End: 2024-07-18

## 2024-07-11 RX ORDER — DEXTROSE MONOHYDRATE AND SODIUM CHLORIDE 5; .3 G/100ML; G/100ML
1000 INJECTION, SOLUTION INTRAVENOUS ONCE
Refills: 0 | Status: COMPLETED | OUTPATIENT
Start: 2024-07-11 | End: 2024-07-11

## 2024-07-11 RX ORDER — MAGNESIUM SULFATE 100 %
2 POWDER (GRAM) MISCELLANEOUS ONCE
Refills: 0 | Status: DISCONTINUED | OUTPATIENT
Start: 2024-07-11 | End: 2024-07-11

## 2024-07-11 RX ORDER — SODIUM CHLORIDE 0.9 % (FLUSH) 0.9 %
1000 SYRINGE (ML) INJECTION
Refills: 0 | Status: DISCONTINUED | OUTPATIENT
Start: 2024-07-11 | End: 2024-07-16

## 2024-07-11 RX ORDER — LURASIDONE HYDROCHLORIDE 20 MG/1
0 TABLET, FILM COATED ORAL
Qty: 0 | Refills: 0 | DISCHARGE

## 2024-07-11 RX ORDER — THIAMINE HCL 100 MG
100 TABLET ORAL DAILY
Refills: 0 | Status: DISCONTINUED | OUTPATIENT
Start: 2024-07-11 | End: 2024-07-16

## 2024-07-11 RX ORDER — FOLIC ACID
1 POWDER (GRAM) MISCELLANEOUS DAILY
Refills: 0 | Status: DISCONTINUED | OUTPATIENT
Start: 2024-07-11 | End: 2024-07-18

## 2024-07-11 RX ORDER — LURASIDONE HYDROCHLORIDE 20 MG/1
40 TABLET, FILM COATED ORAL
Refills: 0 | Status: DISCONTINUED | OUTPATIENT
Start: 2024-07-11 | End: 2024-07-18

## 2024-07-11 RX ADMIN — OLANZAPINE 10 MILLIGRAM(S): 2.5 TABLET, FILM COATED ORAL at 21:49

## 2024-07-11 RX ADMIN — LURASIDONE HYDROCHLORIDE 40 MILLIGRAM(S): 20 TABLET, FILM COATED ORAL at 21:49

## 2024-07-11 RX ADMIN — NICOTINE POLACRILEX 1 PATCH: 2 LOZENGE ORAL at 18:22

## 2024-07-11 RX ADMIN — Medication 3 MILLIGRAM(S): at 22:32

## 2024-07-11 RX ADMIN — Medication 75 MILLILITER(S): at 20:29

## 2024-07-11 RX ADMIN — Medication 25 GRAM(S): at 16:18

## 2024-07-11 RX ADMIN — POTASSIUM CHLORIDE 40 MILLIEQUIVALENT(S): 600 TABLET, FILM COATED, EXTENDED RELEASE ORAL at 16:59

## 2024-07-11 RX ADMIN — Medication 100 MILLIGRAM(S): at 15:43

## 2024-07-11 RX ADMIN — HYDROXYZINE PAMOATE 25 MILLIGRAM(S): 50 CAPSULE ORAL at 20:29

## 2024-07-11 RX ADMIN — DIAZEPAM 5 MILLIGRAM(S): 10 TABLET ORAL at 15:53

## 2024-07-11 RX ADMIN — CHLORDIAZEPOXIDE HYDROCHLORIDE 50 MILLIGRAM(S): 10 CAPSULE ORAL at 21:49

## 2024-07-11 RX ADMIN — DEXTROSE MONOHYDRATE AND SODIUM CHLORIDE 1000 MILLILITER(S): 5; .3 INJECTION, SOLUTION INTRAVENOUS at 16:18

## 2024-07-11 RX ADMIN — NICOTINE POLACRILEX 1 PATCH: 2 LOZENGE ORAL at 20:53

## 2024-07-11 NOTE — H&P ADULT - NSHPPHYSICALEXAM_GEN_ALL_CORE
ICU Vital Signs Last 24 Hrs  T(C): 36.6 (11 Jul 2024 14:05), Max: 36.6 (11 Jul 2024 14:05)  T(F): 97.9 (11 Jul 2024 14:05), Max: 97.9 (11 Jul 2024 14:05)  HR: 96 (11 Jul 2024 14:05) (96 - 96)  BP: 114/79 (11 Jul 2024 14:05) (114/79 - 114/79)  BP(mean): --  ABP: --  ABP(mean): --  RR: 18 (11 Jul 2024 14:05) (18 - 18)  SpO2: 96% (11 Jul 2024 14:05) (96% - 96%)    O2 Parameters below as of 11 Jul 2024 14:05  Patient On (Oxygen Delivery Method): room air        Constitutional: NAD, well-nourished, non toxic appearing   HEENT: Airway patent, moist MM, no erythema/swelling/deformity of oral structures. EOMI, PERRLA.  CV: regular rate, regular rhythm, well-perfused extremities, 2+ b/l DP and radial pulses equal.  Lungs: BCTA, no increased WOB.  ABD: NTND, no guarding or rebound, no pulsatile mass, no hernias.   MSK: Neck supple, nontender, nl ROM, no stepoff. Chest nontender. Back nontender in TLS spine or to b/l bony structures or flanks. Ext nontender, nl rom, no deformity. minor tremor   INTEG: Skin warm, dry, no rash.  NEURO: A&Ox3, normal strength, nl sensation throughout, normal speech.   PSYCH: Denies SI/HI/hallucinations

## 2024-07-11 NOTE — H&P ADULT - ASSESSMENT
53-year-old male with past medical history of asthma, PTSD, EtOH abuse, and psychosis p/w etoh withdrawal and elevated LFTs.     # etoh withdrawal  # elevated LFTs   # h/o etoh abuse   - admit to medicine   - librium taper   - monitor for signs of withdrawal   - thiamine/ folate/ IVF   - trend LFTs   - CT:  Hepatic steatosis; no biliary ductal dilation. Few nonspecific prominent upper abdominal lymph nodes.    # magnesium deficiency  - replete and repeat Mag    # tobacco use   - smoking cessation, nicotine replacement       #h/o PTSD, psychosis   - c/w zyprexa     #dvt ppx  # gi ppx   # full code    53-year-old male with past medical history of asthma, PTSD, EtOH abuse, and psychosis p/w etoh withdrawal and elevated LFTs.     # etoh withdrawal  # elevated LFTs   # h/o etoh abuse   - admit to medicine   - librium taper   - monitor for signs of withdrawal   - thiamine/ folate/ IVF   - trend LFTs  - hepatitis panel  - GI consult    - CT:  Hepatic steatosis; no biliary ductal dilation. Few nonspecific prominent upper abdominal lymph nodes.    # magnesium deficiency  - replete and repeat Mag    # tobacco use   - smoking cessation, nicotine replacement     #h/o PTSD, psychosis   - c/w zyprexa, latuda     #dvt ppx  # gi ppx   # full code

## 2024-07-11 NOTE — ED ADULT TRIAGE NOTE - CHIEF COMPLAINT QUOTE
sent in by MD for elevated LFTs, bilirubin, according to wife "significant wt loss in last couple of months, frequent falls and confusion"

## 2024-07-11 NOTE — H&P ADULT - HISTORY OF PRESENT ILLNESS
pt is a 53-year-old male with past medical history of asthma, PTSD, EtOH abuse, and psychosis who presented to ER with wife for concerns of abnormal blood work. Pt states his latest blood work showed elevated LFTs and today he complained of  tremor and confusion. Pt states his last drink was 4 days ago, states he usually drinks daily, drinks a handle of vodka (1.75 L) over 3 days. Denies fever, chills, cp, n/v/d, abdominal pain, dysuria.      pt is a 53-year-old male with past medical history of asthma, PTSD, EtOH abuse, and psychosis who presented to ER with wife for concerns of abnormal blood work. Pt states his latest blood work showed elevated LFTs and today he complained of  tremor and confusion. Pt states his last drink was 4 days ago, states he usually drinks daily, drinks a handle of vodka (1.75 L) over 3 days. Denies fever, chills, cp, n/v/d, abdominal pain, dysuria.    Upon discussion with patient regarding history of pancreatic CA, pt states he was not diagnosed with cancer but was going to see a doctor regarding his elevated LFTs

## 2024-07-11 NOTE — ED ADULT NURSE NOTE - NSICDXPASTMEDICALHX_GEN_ALL_CORE_FT
PAST MEDICAL HISTORY:  Asthma, unspecified asthma severity, unspecified whether complicated, unspecified whether persistent     PTSD (post-traumatic stress disorder)

## 2024-07-11 NOTE — ED PROVIDER NOTE - OBJECTIVE STATEMENT
53-year-old male with past medical history of asthma, PTSD, EtOH abuse, and psychosis who was sent in by his oncologist for elevated LFTs and bilirubin level.  Reports that his oncologist has been working him up for possible pancreatic cancer; reports that he received a phone call today and was told to come to the ED because of abnormal blood work result.  Per wife, patient also has been having intermittent confusion and falls; states the last fall was about a week or 2 ago.  Denies fever, shortness of breath, headache, chest pain, lightheadedness, dizziness, nausea, vomiting, abdominal pain, urinary symptoms, and change with bowel movement.
Cardiac

## 2024-07-11 NOTE — ED PROVIDER NOTE - CLINICAL SUMMARY MEDICAL DECISION MAKING FREE TEXT BOX
Patient sent for elevated LFTs in the setting of a malignancy workup, suspect most of his issues are related to alcohol use and withdrawal, was seen by the catch team, was also evaluated by ICU, stable for floor for medically supervised detox and further evaluation of LFT abnormality

## 2024-07-11 NOTE — H&P ADULT - NSHPLABSRESULTS_GEN_ALL_CORE
12.8   6.59  )-----------( 70       ( 2024 14:34 )             38.0   Urinalysis Basic - ( 2024 16:30 )    Color: Yellow / Appearance: Cloudy / S.007 / pH: x  Gluc: x / Ketone: Negative mg/dL  / Bili: Negative / Urobili: 1.0 mg/dL   Blood: x / Protein: Negative mg/dL / Nitrite: Negative   Leuk Esterase: Negative / RBC: x / WBC x   Sq Epi: x / Non Sq Epi: x / Bacteria: x        140  |  100  |  9<L>  ----------------------------<  114<H>  3.5   |  26  |  0.7    Ca    9.9      2024 14:34  Mg     1.4         TPro  7.5  /  Alb  4.1  /  TBili  1.5<H>  /  DBili  0.5<H>  /  AST  107<H>  /  ALT  64<H>  /  AlkPhos  98  -11    < from: CT Abdomen and Pelvis w/ IV Cont (24 @ 16:13) >      IMPRESSION:  1.  Hepatic steatosis; no biliary ductal dilation.  2.  Fewnonspecific prominent upper abdominal lymph nodes.    --- End of Report ---    < end of copied text >        < from: CT Head No Cont (24 @ 16:13) >      IMPRESSION:  No acute intracranial pathology.    Generalized atrophy out of proportion to the patient's stated age,   progressed since the prior CT    < end of copied text >

## 2024-07-11 NOTE — PATIENT PROFILE ADULT - FUNCTIONAL ASSESSMENT - DAILY ACTIVITY 3.
Conjuntivae and eyelids appear normal, Sclerae : White without injection
4 = No assist / stand by assistance

## 2024-07-11 NOTE — ED PROVIDER NOTE - WHICH SHOWED
Normal sinus rhythm 78 normal axis intervals and ST segments; chest x-ray no infiltrate no effusion no pneumothorax

## 2024-07-11 NOTE — ED PROVIDER NOTE - PROGRESS NOTE DETAILS
Patient is admitted for alcohol withdrawal. Spoke with ICU who evaluated patient and the recommended patient to be admitted to the floor. Pt is aware of the plan and agrees. Pt has been endorsed to MAR.

## 2024-07-11 NOTE — ED PROVIDER NOTE - ATTENDING APP SHARED VISIT CONTRIBUTION OF CARE
Abdominal Pain, N/V/D 53-year-old male alcoholic sent by primary for imaging due to elevated LFTs and one of his cancer markers, per wife patient with intermittent confusion and frequent falls, when asked why patient is in ED he states "because my wife overreacts," states his last drink was 1 week ago and admits to feeling shaky, denies hallucinosis, denies abdominal pain, on exam vitals appreciated, nontoxic, alert and oriented x 2 (is off by 1 day), PERRLA, conjunctive pink, subtle icterus, dry mucous membranes with tongue fasciculations, neck supple no meningismus, cor regular, lungs clear, abdomen soft nontender nondistended, full range of motion x 4, fine resting tremor but no asterixis, neurologically nonfocal, will give thiamine, Valium, check labs and imaging

## 2024-07-11 NOTE — PATIENT PROFILE ADULT - NSPROPASSIVESMOKEEXPOSURE_GEN_A_NUR
Well Child Check - 18 yrs  - Doing well   - Good growth .  Lost weight in past year - purposeful.  Keeping off.   - Normal development   - Vaccines UTD - 2nd bexero today  - adolescent screen - negative.  - Anticipatory guidance done/handouts given   - school px done       No

## 2024-07-11 NOTE — PATIENT PROFILE ADULT - FALL HARM RISK - RISK INTERVENTIONS

## 2024-07-12 LAB
ALBUMIN SERPL ELPH-MCNC: 4.2 G/DL — SIGNIFICANT CHANGE UP (ref 3.5–5.2)
ALP SERPL-CCNC: 100 U/L — SIGNIFICANT CHANGE UP (ref 30–115)
ALT FLD-CCNC: 58 U/L — HIGH (ref 0–41)
ANION GAP SERPL CALC-SCNC: 12 MMOL/L — SIGNIFICANT CHANGE UP (ref 7–14)
AST SERPL-CCNC: 88 U/L — HIGH (ref 0–41)
BASOPHILS # BLD AUTO: 0.02 K/UL — SIGNIFICANT CHANGE UP (ref 0–0.2)
BASOPHILS NFR BLD AUTO: 0.4 % — SIGNIFICANT CHANGE UP (ref 0–1)
BILIRUB SERPL-MCNC: 1.4 MG/DL — HIGH (ref 0.2–1.2)
BUN SERPL-MCNC: 7 MG/DL — LOW (ref 10–20)
CALCIUM SERPL-MCNC: 9.5 MG/DL — SIGNIFICANT CHANGE UP (ref 8.4–10.5)
CHLORIDE SERPL-SCNC: 106 MMOL/L — SIGNIFICANT CHANGE UP (ref 98–110)
CO2 SERPL-SCNC: 26 MMOL/L — SIGNIFICANT CHANGE UP (ref 17–32)
CREAT SERPL-MCNC: 0.6 MG/DL — LOW (ref 0.7–1.5)
EGFR: 116 ML/MIN/1.73M2 — SIGNIFICANT CHANGE UP
EOSINOPHIL # BLD AUTO: 0.02 K/UL — SIGNIFICANT CHANGE UP (ref 0–0.7)
EOSINOPHIL NFR BLD AUTO: 0.4 % — SIGNIFICANT CHANGE UP (ref 0–8)
GLUCOSE SERPL-MCNC: 87 MG/DL — SIGNIFICANT CHANGE UP (ref 70–99)
HCT VFR BLD CALC: 41.7 % — LOW (ref 42–52)
HGB BLD-MCNC: 14 G/DL — SIGNIFICANT CHANGE UP (ref 14–18)
IMM GRANULOCYTES NFR BLD AUTO: 0.4 % — HIGH (ref 0.1–0.3)
LYMPHOCYTES # BLD AUTO: 1.92 K/UL — SIGNIFICANT CHANGE UP (ref 1.2–3.4)
LYMPHOCYTES # BLD AUTO: 33.8 % — SIGNIFICANT CHANGE UP (ref 20.5–51.1)
MAGNESIUM SERPL-MCNC: 2.1 MG/DL — SIGNIFICANT CHANGE UP (ref 1.8–2.4)
MCHC RBC-ENTMCNC: 33.3 PG — HIGH (ref 27–31)
MCHC RBC-ENTMCNC: 33.6 G/DL — SIGNIFICANT CHANGE UP (ref 32–37)
MCV RBC AUTO: 99.3 FL — HIGH (ref 80–94)
MONOCYTES # BLD AUTO: 0.63 K/UL — HIGH (ref 0.1–0.6)
MONOCYTES NFR BLD AUTO: 11.1 % — HIGH (ref 1.7–9.3)
NEUTROPHILS # BLD AUTO: 3.07 K/UL — SIGNIFICANT CHANGE UP (ref 1.4–6.5)
NEUTROPHILS NFR BLD AUTO: 53.9 % — SIGNIFICANT CHANGE UP (ref 42.2–75.2)
NRBC # BLD: 0 /100 WBCS — SIGNIFICANT CHANGE UP (ref 0–0)
PLATELET # BLD AUTO: 75 K/UL — LOW (ref 130–400)
PMV BLD: 11.3 FL — HIGH (ref 7.4–10.4)
POTASSIUM SERPL-MCNC: 3.8 MMOL/L — SIGNIFICANT CHANGE UP (ref 3.5–5)
POTASSIUM SERPL-SCNC: 3.8 MMOL/L — SIGNIFICANT CHANGE UP (ref 3.5–5)
PROT SERPL-MCNC: 7.5 G/DL — SIGNIFICANT CHANGE UP (ref 6–8)
RBC # BLD: 4.2 M/UL — LOW (ref 4.7–6.1)
RBC # FLD: 15.9 % — HIGH (ref 11.5–14.5)
SODIUM SERPL-SCNC: 144 MMOL/L — SIGNIFICANT CHANGE UP (ref 135–146)
WBC # BLD: 5.68 K/UL — SIGNIFICANT CHANGE UP (ref 4.8–10.8)
WBC # FLD AUTO: 5.68 K/UL — SIGNIFICANT CHANGE UP (ref 4.8–10.8)

## 2024-07-12 PROCEDURE — 99223 1ST HOSP IP/OBS HIGH 75: CPT

## 2024-07-12 PROCEDURE — 99232 SBSQ HOSP IP/OBS MODERATE 35: CPT

## 2024-07-12 RX ORDER — LORAZEPAM 0.5 MG
2 TABLET ORAL
Refills: 0 | Status: DISCONTINUED | OUTPATIENT
Start: 2024-07-12 | End: 2024-07-18

## 2024-07-12 RX ADMIN — Medication 1 MILLIGRAM(S): at 11:32

## 2024-07-12 RX ADMIN — Medication 2 MILLIGRAM(S): at 11:35

## 2024-07-12 RX ADMIN — Medication 2 MILLIGRAM(S): at 18:04

## 2024-07-12 RX ADMIN — NICOTINE POLACRILEX 1 PATCH: 2 LOZENGE ORAL at 11:32

## 2024-07-12 RX ADMIN — Medication 2 MILLIGRAM(S): at 13:48

## 2024-07-12 RX ADMIN — CHLORDIAZEPOXIDE HYDROCHLORIDE 50 MILLIGRAM(S): 10 CAPSULE ORAL at 05:50

## 2024-07-12 RX ADMIN — LURASIDONE HYDROCHLORIDE 40 MILLIGRAM(S): 20 TABLET, FILM COATED ORAL at 18:05

## 2024-07-12 RX ADMIN — CHLORDIAZEPOXIDE HYDROCHLORIDE 50 MILLIGRAM(S): 10 CAPSULE ORAL at 09:58

## 2024-07-12 RX ADMIN — ENOXAPARIN SODIUM 40 MILLIGRAM(S): 100 INJECTION SUBCUTANEOUS at 18:05

## 2024-07-12 RX ADMIN — HYDROXYZINE PAMOATE 25 MILLIGRAM(S): 50 CAPSULE ORAL at 02:30

## 2024-07-12 RX ADMIN — HYDROXYZINE PAMOATE 25 MILLIGRAM(S): 50 CAPSULE ORAL at 12:41

## 2024-07-12 RX ADMIN — CHLORDIAZEPOXIDE HYDROCHLORIDE 50 MILLIGRAM(S): 10 CAPSULE ORAL at 02:03

## 2024-07-12 RX ADMIN — NICOTINE POLACRILEX 1 PATCH: 2 LOZENGE ORAL at 18:05

## 2024-07-12 RX ADMIN — OLANZAPINE 10 MILLIGRAM(S): 2.5 TABLET, FILM COATED ORAL at 11:42

## 2024-07-12 RX ADMIN — Medication 2 MILLIGRAM(S): at 23:35

## 2024-07-12 RX ADMIN — Medication 100 MILLIGRAM(S): at 11:33

## 2024-07-12 RX ADMIN — NICOTINE POLACRILEX 1 PATCH: 2 LOZENGE ORAL at 17:47

## 2024-07-12 RX ADMIN — Medication 1 APPLICATION(S): at 05:51

## 2024-07-12 NOTE — CONSULT NOTE ADULT - ASSESSMENT
Alcoholic hepatitis:  no cirrhosis on CT or biliary dilation   AST>ALT  Low MDF    recommendations:  get US RUQ  screen for hepatitis A, b, c  monitor for withdrawal  multivitamin per primary team    counselled extensively about alcohol cessation   Follow up with our [facility] located at [address] [place] [address], [facility]. Telephone No: [phone]      Discussed with wife at bedside

## 2024-07-12 NOTE — CONSULT NOTE ADULT - SUBJECTIVE AND OBJECTIVE BOX
52yo m with h/o etoh use d/o, PMH of asthma, PPH of psych disorder, likely PTSD s/p 9/11 clean-up, was sent to ED by oncologist for evaluation due to elevated LFTs, bilirubin and, reportedly, one of the CA markers. Reportedly, patient has been having intermittent confusion and falls PTA, last 1-2 weeks ago.     Patient was seen in his room with CATCH team. He presented A,Ox3, pleasant, polite, mildly anxious. He reports last drink about 4 days PTA, states he had been drinking 6-8 mixed drinks in 24 hours daily for about 10 years, but stopped being scared by abnormal lab results. He reports past h/o "shakes", denies h/o withdrawal SZs or DTs. Currently he is on Librium taper, and reports feeling "a little jittery" and anxious, with feeling that "something is crawling on my skin" but no tactile hallucinations, no AH/Vh, no sweats, no N/V and no HA. No tremor was observed on this interview. CIWA score is 6.    Reports h/o etoh rehab in 2018, no h/o AA.    He reports using prescribed cannabis (edibles) for the last 6 months, denies illicit drug use, denies smoking.    PPH: reports h/o PTSD s/p 9/11 with nightmares, painful memories and avoidant behavior, no SAs, no NSSIB, no IPP admissions. States he is followed by a psychiatrist on SI.    SH: lives with wife and 3 kids, reports good family relationship, wife is supportive.    MSE: A,Ox3, cooperative, well related with good eye contact, no pma/r, speech NL r/r/v, mood mildly anxious, affect anxious, t/p linear, t/c no si/hi/ah/vh, no delusions elicited, i/j not impaired.    Vital Signs Last 24 Hrs  T(C): 36.6 (12 Jul 2024 05:55), Max: 36.8 (11 Jul 2024 20:41)  T(F): 97.8 (12 Jul 2024 05:55), Max: 98.2 (11 Jul 2024 20:41)  HR: 76 (12 Jul 2024 05:55) (72 - 96)  BP: 124/69 (12 Jul 2024 05:55) (114/79 - 136/87)  BP(mean): --  RR: 18 (12 Jul 2024 05:55) (18 - 18)  SpO2: 98% (12 Jul 2024 05:55) (96% - 100%)    Parameters below as of 12 Jul 2024 05:55  Patient On (Oxygen Delivery Method): room air    LABS                        14.0   5.68  )-----------( 75       ( 12 Jul 2024 08:02 )             41.7     07-12    144  |  106  |  7<L>  ----------------------------<  87  3.8   |  26  |  0.6<L>    Ca    9.5      12 Jul 2024 08:02  Mg     2.1     07-12    TPro  7.5  /  Alb  4.2  /  TBili  1.4<H>  /  DBili  x   /  AST  88<H>  /  ALT  58<H>  /  AlkPhos  100  07-12      Head CT: atrophy out of proportion to the age, progressive since prior CT.

## 2024-07-12 NOTE — CONSULT NOTE ADULT - SUBJECTIVE AND OBJECTIVE BOX
Gastroenterology Consultation:    Patient is a 53y old  Male who presents with a chief complaint of etoh withdrawal (12 Jul 2024 09:46)        Admitted on: 07-11-24      HPI:  pt is a 53-year-old male with past medical history of asthma, PTSD, EtOH abuse, and psychosis who presented to ER with wife for concerns of abnormal blood work. Pt states his latest blood work showed elevated LFTs and today he complained of  tremor and confusion. Pt states his last drink was 4 days ago, states he usually drinks daily, drinks a handle of vodka (1.75 L) over 3 days. Denies fever, chills, cp, n/v/d, abdominal pain, dysuria. GI is called for evaluation.       PAST MEDICAL & SURGICAL HISTORY:  PTSD (post-traumatic stress disorder)      Asthma, unspecified asthma severity, unspecified whether complicated, unspecified whether persistent      No significant past surgical history            FAMILY HISTORY:  negative for gi malignancy     Social History:  Tobacco: denies  Alcohol: denies  Drugs: denies    Home Medications:  lurasidone 40 mg tablet: TAKE ONE TABLET BY MOUTH DAILY WITH DINNER (11 Jul 2024 19:30)  Multiple Vitamins oral tablet: 1 tab(s) orally once a day. Continue to take until told otherwise by your provider.  (18 Jun 2020 10:39)  nicotine 21 mg/24 hr transdermal film, extended release: 1 patch transdermal once a day for nicotine craving. Continue to take until told otherwise by your provider.  (18 Jun 2020 10:37)  olanzapine 10 mg tablet: TAKE ONE TABLET BY MOUTH DAILY (11 Jul 2024 19:30)        MEDICATIONS  (STANDING):  chlorhexidine 2% Cloths 1 Application(s) Topical <User Schedule>  enoxaparin Injectable 40 milliGRAM(s) SubCutaneous every 24 hours  folic acid 1 milliGRAM(s) Oral daily  lurasidone 40 milliGRAM(s) Oral <User Schedule>  nicotine - 21 mG/24Hr(s) Patch 1 Patch Transdermal daily  OLANZapine 10 milliGRAM(s) Oral daily  sodium chloride 0.9%. 1000 milliLiter(s) (75 mL/Hr) IV Continuous <Continuous>  thiamine 100 milliGRAM(s) Oral daily    MEDICATIONS  (PRN):  aluminum hydroxide/magnesium hydroxide/simethicone Suspension 30 milliLiter(s) Oral every 4 hours PRN Dyspepsia  hydrOXYzine hydrochloride 25 milliGRAM(s) Oral every 6 hours PRN Anxiety  LORazepam   Injectable 2 milliGRAM(s) IV Push every 2 hours PRN Symptom-triggered: 2 point increase in CIWA -Ar score and a total score of 7 or LESS  melatonin 3 milliGRAM(s) Oral at bedtime PRN Insomnia  ondansetron Injectable 4 milliGRAM(s) IV Push every 8 hours PRN Nausea and/or Vomiting      Allergies  No Known Allergies      Review of Systems:  limited by mental status           Physical Examination:  T(C): 36.8 (07-12-24 @ 11:30), Max: 36.8 (07-11-24 @ 20:41)  HR: 73 (07-12-24 @ 11:30) (72 - 76)  BP: 124/81 (07-12-24 @ 11:30) (124/69 - 136/87)  RR: 18 (07-12-24 @ 05:55) (18 - 18)  SpO2: 95% (07-12-24 @ 11:30) (95% - 100%)          GENERAL: confused   HEAD:  Atraumatic, Normocephalic  EYES: conjunctiva and sclera clear  NECK: Supple, no JVD or thyromegaly  CHEST/LUNG: Clear to auscultation bilaterally; No wheeze, rhonchi, or rales  HEART: Regular rate and rhythm; normal S1, S2, No murmurs.  ABDOMEN: Soft, nontender, nondistended; Bowel sounds present  NEUROLOGY: No asterixis or tremor.   SKIN: Intact, no jaundice        Data:                        14.0   5.68  )-----------( 75       ( 12 Jul 2024 08:02 )             41.7     Hgb Trend:  14.0  07-12-24 @ 08:02  12.8  07-11-24 @ 14:34      07-12    144  |  106  |  7<L>  ----------------------------<  87  3.8   |  26  |  0.6<L>    Ca    9.5      12 Jul 2024 08:02  Mg     2.1     07-12    TPro  7.5  /  Alb  4.2  /  TBili  1.4<H>  /  DBili  x   /  AST  88<H>  /  ALT  58<H>  /  AlkPhos  100  07-12    Liver panel trend:  TBili 1.4   /   AST 88   /   ALT 58   /   AlkP 100   /   Tptn 7.5   /   Alb 4.2    /   DBili --      07-12  TBili 1.5   /      /   ALT 64   /   AlkP 98   /   Tptn 7.5   /   Alb 4.1    /   DBili 0.5      07-11      PT/INR - ( 11 Jul 2024 14:34 )   PT: 12.90 sec;   INR: 1.13 ratio         PTT - ( 11 Jul 2024 14:34 )  PTT:31.5 sec    Urinalysis with Rflx Culture (collected 11 Jul 2024 16:30)          Radiology:  CT Abdomen and Pelvis w/ IV Cont:   ACC: [id number] EXAM:  CT ABDOMEN AND PELVIS IC   ORDERED BY: SILVESTRE KRAMER     PROCEDURE DATE:  07/11/2024          INTERPRETATION:  CLINICAL STATEMENT: Elevated LFTs and bilirubin. "Rule   out neoplasm "    TECHNIQUE: Contiguous axial CT images were obtained from the lower chest   to the pubic symphysis following administration of intravenous contrast.    95 cc administered of Omnipaque 350 (5 cc discarded).  Oral contrast was   not administered.  Reformatted images in the coronal and sagittal planes   were acquired.    COMPARISON CT: January 10, 2019    OTHER STUDIES USED FOR CORRELATION: None.      FINDINGS:    LOWER CHEST: Bibasilar subsegmental dependent atelectasis.    HEPATOBILIARY: Diffuse hepatic steatosis. No focal hepatic lesions.   Unremarkable gallbladder. No biliary ductal dilation.    SPLEEN: Unremarkable.    PANCREAS: No focal pancreatic lesion or ductal dilation.    ADRENAL GLANDS: Unremarkable.    KIDNEYS: Symmetric renal enhancement bilaterally. No hydronephrosis.    ABDOMINOPELVIC NODES: Few nonspecific prominent upper abdominal lymph   nodes. A periportal lymph node measures 2.3 x 1.1 cm and a peripancreatic   lymph node measures 1.4 x 1.1 cm.    PELVIC ORGANS: Unremarkable.    PERITONEUM/MESENTERY/BOWEL: No bowel obstruction, ascites or   pneumoperitoneum. Normal appendix.    BONES/SOFT TISSUES: Healing left posterior rib fractures. Degenerative   changes of the spine and hips noted.      IMPRESSION:  1.  Hepatic steatosis; no biliary ductal dilation.  2.  Fewnonspecific prominent upper abdominal lymph nodes.    --- End of Report ---            FRANKLYN NGO MD; Attending Radiologist  This document has been electronically signed. Jul 11 2024  4:50PM (07-11-24 @ 16:13)

## 2024-07-12 NOTE — CONSULT NOTE ADULT - ASSESSMENT
54yo m with etoh use d/o sent to the hospital by oncologist to r/o CA and for alcohol detox. Currently VS WNL, mild anxiety, tactile disturbance.    - recommend switch from Librium to Ativan protocol (mild) due to liver Fx impairment and h/o "confusion" as per HX. Hold doses for sedation.  - Thiamine, folate, MVI.  - Encourage post-d/c Tx for alcohol use d/o - rehab, AA groups  - CATCH team will follow.      Case was discussed with Dr Bower (#[id number])

## 2024-07-13 DIAGNOSIS — F10.239 ALCOHOL DEPENDENCE WITH WITHDRAWAL, UNSPECIFIED: ICD-10-CM

## 2024-07-13 LAB
ALBUMIN SERPL ELPH-MCNC: 3.9 G/DL — SIGNIFICANT CHANGE UP (ref 3.5–5.2)
ALP SERPL-CCNC: 93 U/L — SIGNIFICANT CHANGE UP (ref 30–115)
ALT FLD-CCNC: 49 U/L — HIGH (ref 0–41)
ANION GAP SERPL CALC-SCNC: 16 MMOL/L — HIGH (ref 7–14)
AST SERPL-CCNC: 61 U/L — HIGH (ref 0–41)
BASOPHILS # BLD AUTO: 0.03 K/UL — SIGNIFICANT CHANGE UP (ref 0–0.2)
BASOPHILS NFR BLD AUTO: 0.5 % — SIGNIFICANT CHANGE UP (ref 0–1)
BILIRUB DIRECT SERPL-MCNC: 0.3 MG/DL — SIGNIFICANT CHANGE UP (ref 0–0.3)
BILIRUB INDIRECT FLD-MCNC: 0.7 MG/DL — SIGNIFICANT CHANGE UP (ref 0.2–1.2)
BILIRUB SERPL-MCNC: 1 MG/DL — SIGNIFICANT CHANGE UP (ref 0.2–1.2)
BUN SERPL-MCNC: 10 MG/DL — SIGNIFICANT CHANGE UP (ref 10–20)
CALCIUM SERPL-MCNC: 8.9 MG/DL — SIGNIFICANT CHANGE UP (ref 8.4–10.5)
CHLORIDE SERPL-SCNC: 104 MMOL/L — SIGNIFICANT CHANGE UP (ref 98–110)
CO2 SERPL-SCNC: 23 MMOL/L — SIGNIFICANT CHANGE UP (ref 17–32)
CREAT SERPL-MCNC: 0.6 MG/DL — LOW (ref 0.7–1.5)
EGFR: 116 ML/MIN/1.73M2 — SIGNIFICANT CHANGE UP
EOSINOPHIL # BLD AUTO: 0.02 K/UL — SIGNIFICANT CHANGE UP (ref 0–0.7)
EOSINOPHIL NFR BLD AUTO: 0.3 % — SIGNIFICANT CHANGE UP (ref 0–8)
GGT SERPL-CCNC: 620 U/L — HIGH (ref 1–40)
GLUCOSE SERPL-MCNC: 100 MG/DL — HIGH (ref 70–99)
HAV IGM SER-ACNC: SIGNIFICANT CHANGE UP
HBV CORE IGM SER-ACNC: SIGNIFICANT CHANGE UP
HBV SURFACE AG SER-ACNC: SIGNIFICANT CHANGE UP
HCT VFR BLD CALC: 40.4 % — LOW (ref 42–52)
HCV AB S/CO SERPL IA: 0.1 S/CO — SIGNIFICANT CHANGE UP (ref 0–0.99)
HCV AB SERPL-IMP: SIGNIFICANT CHANGE UP
HGB BLD-MCNC: 13.4 G/DL — LOW (ref 14–18)
IMM GRANULOCYTES NFR BLD AUTO: 0.3 % — SIGNIFICANT CHANGE UP (ref 0.1–0.3)
LYMPHOCYTES # BLD AUTO: 1.52 K/UL — SIGNIFICANT CHANGE UP (ref 1.2–3.4)
LYMPHOCYTES # BLD AUTO: 26 % — SIGNIFICANT CHANGE UP (ref 20.5–51.1)
MAGNESIUM SERPL-MCNC: 1.8 MG/DL — SIGNIFICANT CHANGE UP (ref 1.8–2.4)
MCHC RBC-ENTMCNC: 33 PG — HIGH (ref 27–31)
MCHC RBC-ENTMCNC: 33.2 G/DL — SIGNIFICANT CHANGE UP (ref 32–37)
MCV RBC AUTO: 99.5 FL — HIGH (ref 80–94)
MONOCYTES # BLD AUTO: 0.67 K/UL — HIGH (ref 0.1–0.6)
MONOCYTES NFR BLD AUTO: 11.5 % — HIGH (ref 1.7–9.3)
NEUTROPHILS # BLD AUTO: 3.59 K/UL — SIGNIFICANT CHANGE UP (ref 1.4–6.5)
NEUTROPHILS NFR BLD AUTO: 61.4 % — SIGNIFICANT CHANGE UP (ref 42.2–75.2)
NRBC # BLD: 0 /100 WBCS — SIGNIFICANT CHANGE UP (ref 0–0)
PHOSPHATE SERPL-MCNC: 3.9 MG/DL — SIGNIFICANT CHANGE UP (ref 2.1–4.9)
PLATELET # BLD AUTO: 86 K/UL — LOW (ref 130–400)
PMV BLD: 11.7 FL — HIGH (ref 7.4–10.4)
POTASSIUM SERPL-MCNC: 3.3 MMOL/L — LOW (ref 3.5–5)
POTASSIUM SERPL-SCNC: 3.3 MMOL/L — LOW (ref 3.5–5)
PROT SERPL-MCNC: 6.7 G/DL — SIGNIFICANT CHANGE UP (ref 6–8)
RBC # BLD: 4.06 M/UL — LOW (ref 4.7–6.1)
RBC # FLD: 15.6 % — HIGH (ref 11.5–14.5)
SODIUM SERPL-SCNC: 143 MMOL/L — SIGNIFICANT CHANGE UP (ref 135–146)
WBC # BLD: 5.85 K/UL — SIGNIFICANT CHANGE UP (ref 4.8–10.8)
WBC # FLD AUTO: 5.85 K/UL — SIGNIFICANT CHANGE UP (ref 4.8–10.8)

## 2024-07-13 PROCEDURE — 99232 SBSQ HOSP IP/OBS MODERATE 35: CPT

## 2024-07-13 PROCEDURE — 99221 1ST HOSP IP/OBS SF/LOW 40: CPT

## 2024-07-13 RX ORDER — POTASSIUM CHLORIDE 600 MG/1
20 TABLET, FILM COATED, EXTENDED RELEASE ORAL EVERY 4 HOURS
Refills: 0 | Status: COMPLETED | OUTPATIENT
Start: 2024-07-13 | End: 2024-07-13

## 2024-07-13 RX ADMIN — ENOXAPARIN SODIUM 40 MILLIGRAM(S): 100 INJECTION SUBCUTANEOUS at 17:07

## 2024-07-13 RX ADMIN — POTASSIUM CHLORIDE 20 MILLIEQUIVALENT(S): 600 TABLET, FILM COATED, EXTENDED RELEASE ORAL at 09:04

## 2024-07-13 RX ADMIN — NICOTINE POLACRILEX 1 PATCH: 2 LOZENGE ORAL at 11:02

## 2024-07-13 RX ADMIN — HYDROXYZINE PAMOATE 25 MILLIGRAM(S): 50 CAPSULE ORAL at 09:10

## 2024-07-13 RX ADMIN — HYDROXYZINE PAMOATE 25 MILLIGRAM(S): 50 CAPSULE ORAL at 21:05

## 2024-07-13 RX ADMIN — Medication 100 MILLIGRAM(S): at 11:01

## 2024-07-13 RX ADMIN — LURASIDONE HYDROCHLORIDE 40 MILLIGRAM(S): 20 TABLET, FILM COATED ORAL at 17:06

## 2024-07-13 RX ADMIN — Medication 1 MILLIGRAM(S): at 11:01

## 2024-07-13 RX ADMIN — OLANZAPINE 10 MILLIGRAM(S): 2.5 TABLET, FILM COATED ORAL at 11:01

## 2024-07-13 RX ADMIN — POTASSIUM CHLORIDE 20 MILLIEQUIVALENT(S): 600 TABLET, FILM COATED, EXTENDED RELEASE ORAL at 13:39

## 2024-07-13 NOTE — CONSULT NOTE ADULT - PROBLEM SELECTOR RECOMMENDATION 9
After evaluation at this time continue current protocol until completion secondary to withdrawal history.  Pt should be on Thiamine and Folic acid. Pt will be monitored and supportive care provided.    Use of Vistaril for anxiety.  Consider adding gabapentin for anxiety standing order and follow up with PMD/Program for continuation of treatment.    Abdominal ultrasound AFP every 6 months for HCC screening  -EGD outpatient for esophageal varices screening   -Follow up with Private GI or our [facility] located at [address] [place] [address]. Phone Number: [phone]  -Alcohol counseling provided   CATCH team involved for aftercare and pt will follow up with aftercare to Rehab wife involved with treatment.

## 2024-07-13 NOTE — CONSULT NOTE ADULT - SUBJECTIVE AND OBJECTIVE BOX
pt is a 53-year-old male with past medical history of asthma, PTSD, EtOH abuse, and psychosis who presented to ER with wife for concerns of abnormal blood work. Pt states his latest blood work showed elevated LFTs and today he complained of  tremor and confusion. Pt states his last drink was 4 days ago, states he usually drinks daily, drinks a handle of vodka (1.75 L) over 3 days. Denies fever, chills, cp, n/v/d, abdominal pain, dysuria.    Upon discussion with patient regarding history of pancreatic CA, pt states he was not diagnosed with cancer but was going to see a doctor regarding his elevated LFTs          Pt interviewed, examined and EMR chart reviewed.  Pt admits to drinking--- x    months. Last Drink   Hx of withdrawal   variable periods of sobriety in the past.  Has been in detox before _____yes,   _____No    SOCIAL HISTORY:    REVIEW OF SYSTEMS:    Constitutional: No fever, weight loss or fatigue  ENT:  No difficulty hearing, tinnitus, vertigo; No sinus or throat pain  Neck: No pain or stiffness  Respiratory: No cough, wheezing, chills or hemoptysis  Cardiovascular: No chest pain, palpitations, shortness of breath, dizziness or leg swelling  Gastrointestinal: No abdominal or epigastric pain. No nausea, vomiting or hematemesis; No diarrhea or constipation. No melena or hematochezia.  Neurological: No headaches, memory loss, loss of strength, numbness or tremors  Musculoskeletal: No joint pain or swelling; No muscle, back or extremity pain  Psychiatric: No depression, anxiety, mood swings or difficulty sleeping    MEDICATIONS  (STANDING):  chlorhexidine 2% Cloths 1 Application(s) Topical <User Schedule>  enoxaparin Injectable 40 milliGRAM(s) SubCutaneous every 24 hours  folic acid 1 milliGRAM(s) Oral daily  lurasidone 40 milliGRAM(s) Oral <User Schedule>  nicotine - 21 mG/24Hr(s) Patch 1 Patch Transdermal daily  OLANZapine 10 milliGRAM(s) Oral daily  sodium chloride 0.9%. 1000 milliLiter(s) (75 mL/Hr) IV Continuous <Continuous>  thiamine 100 milliGRAM(s) Oral daily    MEDICATIONS  (PRN):  aluminum hydroxide/magnesium hydroxide/simethicone Suspension 30 milliLiter(s) Oral every 4 hours PRN Dyspepsia  hydrOXYzine hydrochloride 25 milliGRAM(s) Oral every 6 hours PRN Anxiety  LORazepam   Injectable 2 milliGRAM(s) IV Push every 2 hours PRN Symptom-triggered: 2 point increase in CIWA -Ar score and a total score of 7 or LESS  melatonin 3 milliGRAM(s) Oral at bedtime PRN Insomnia  ondansetron Injectable 4 milliGRAM(s) IV Push every 8 hours PRN Nausea and/or Vomiting      Vital Signs Last 24 Hrs  T(C): 36.6 (13 Jul 2024 13:10), Max: 36.6 (13 Jul 2024 04:38)  T(F): 97.8 (13 Jul 2024 13:10), Max: 97.9 (13 Jul 2024 04:38)  HR: 83 (13 Jul 2024 13:10) (64 - 83)  BP: 107/68 (13 Jul 2024 13:10) (107/68 - 116/73)  BP(mean): --  RR: 16 (13 Jul 2024 04:38) (16 - 16)  SpO2: 97% (13 Jul 2024 13:10) (97% - 97%)    Parameters below as of 13 Jul 2024 13:10  Patient On (Oxygen Delivery Method): room air        PHYSICAL EXAM:    Constitutional: NAD, well-groomed, well-developed  HEENT: PERRLA, EOMI, Normal Hearing,  Neck: No LAD, No JVD  Back: Normal spine flexure, No CVA tenderness  Respiratory: CTAB/L  Cardiovascular: S1 and S2, RRR, no M/G/R  Gastrointestinal: BS+, soft, NT/ND  Extremities: No peripheral edema  Neurological: A/O x 3, no focal deficits    LABS:                        13.4   5.85  )-----------( 86       ( 13 Jul 2024 05:48 )             40.4     07-13    143  |  104  |  10  ----------------------------<  100<H>  3.3<L>   |  23  |  0.6<L>    Ca    8.9      13 Jul 2024 05:48  Phos  3.9     07-13  Mg     1.8     07-13    TPro  6.7  /  Alb  3.9  /  TBili  1.0  /  DBili  0.3  /  AST  61<H>  /  ALT  49<H>  /  AlkPhos  93  07-13      Urinalysis Basic - ( 13 Jul 2024 05:48 )    Color: x / Appearance: x / SG: x / pH: x  Gluc: 100 mg/dL / Ketone: x  / Bili: x / Urobili: x   Blood: x / Protein: x / Nitrite: x   Leuk Esterase: x / RBC: x / WBC x   Sq Epi: x / Non Sq Epi: x / Bacteria: x      Drug Screen Urine:  Alcohol Level  Alcohol, Blood: <10 mg/dL (07-11-24 @ 15:03)        RADIOLOGY & ADDITIONAL STUDIES:   pt is a 53-year-old male with past medical history of asthma, PTSD, EtOH abuse, and psychosis who presented to ER with wife for concerns of abnormal blood work. Pt states his latest blood work showed elevated LFTs and today he complained of  tremor and confusion. Pt states his last drink was 4 days ago, states he usually drinks daily, drinks a handle of vodka (1.75 L) over 3 days. Denies fever, chills, cp, n/v/d, abdominal pain, dysuria.    Upon discussion with patient regarding history of pancreatic CA, pt states he was not diagnosed with cancer but was going to see a doctor regarding his elevated LFTs          Pt interviewed, examined and EMR chart reviewed.  Pt admits to drinking heavy stopped about 6 days ago now and was sent in by PMD for treatment. He drinks about 500-600ml . Last Drink 4 DPTA   Hx of withdrawal TREMORS, dts, DENIES SEIZURES  variable periods of sobriety in the past.  Has been in detox before __X___yes,   _____No    SOCIAL HISTORY:    REVIEW OF SYSTEMS:    Constitutional: No fever, weight loss or fatigue  ENT:  No difficulty hearing, tinnitus, vertigo; No sinus or throat pain  Neck: No pain or stiffness  Respiratory: No cough, wheezing, chills or hemoptysis  Cardiovascular: No chest pain, palpitations, shortness of breath, dizziness or leg swelling  Gastrointestinal: No abdominal or epigastric pain. No nausea, vomiting or hematemesis; No diarrhea or constipation. No melena or hematochezia.  Neurological: No headaches, memory loss, loss of strength, numbness or tremors  Musculoskeletal: No joint pain or swelling; No muscle, back or extremity pain  Psychiatric: No depression, anxiety, mood swings or difficulty sleeping    MEDICATIONS  (STANDING):  chlorhexidine 2% Cloths 1 Application(s) Topical <User Schedule>  enoxaparin Injectable 40 milliGRAM(s) SubCutaneous every 24 hours  folic acid 1 milliGRAM(s) Oral daily  lurasidone 40 milliGRAM(s) Oral <User Schedule>  nicotine - 21 mG/24Hr(s) Patch 1 Patch Transdermal daily  OLANZapine 10 milliGRAM(s) Oral daily  sodium chloride 0.9%. 1000 milliLiter(s) (75 mL/Hr) IV Continuous <Continuous>  thiamine 100 milliGRAM(s) Oral daily    MEDICATIONS  (PRN):  aluminum hydroxide/magnesium hydroxide/simethicone Suspension 30 milliLiter(s) Oral every 4 hours PRN Dyspepsia  hydrOXYzine hydrochloride 25 milliGRAM(s) Oral every 6 hours PRN Anxiety  LORazepam   Injectable 2 milliGRAM(s) IV Push every 2 hours PRN Symptom-triggered: 2 point increase in CIWA -Ar score and a total score of 7 or LESS  melatonin 3 milliGRAM(s) Oral at bedtime PRN Insomnia  ondansetron Injectable 4 milliGRAM(s) IV Push every 8 hours PRN Nausea and/or Vomiting      Vital Signs Last 24 Hrs  T(C): 36.6 (13 Jul 2024 13:10), Max: 36.6 (13 Jul 2024 04:38)  T(F): 97.8 (13 Jul 2024 13:10), Max: 97.9 (13 Jul 2024 04:38)  HR: 83 (13 Jul 2024 13:10) (64 - 83)  BP: 107/68 (13 Jul 2024 13:10) (107/68 - 116/73)  BP(mean): --  RR: 16 (13 Jul 2024 04:38) (16 - 16)  SpO2: 97% (13 Jul 2024 13:10) (97% - 97%)    Parameters below as of 13 Jul 2024 13:10  Patient On (Oxygen Delivery Method): room air        PHYSICAL EXAM:    Constitutional: NAD, well-groomed, well-developed  HEENT: PERRLA, EOMI, Normal Hearing,  Neck: No LAD, No JVD  Back: Normal spine flexure, No CVA tenderness  Respiratory: CTAB/L  Cardiovascular: S1 and S2, RRR, no M/G/R  Gastrointestinal: BS+, soft, NT/ND  Extremities: No peripheral edema  Neurological: A/O x 3, no focal deficits    LABS:                        13.4   5.85  )-----------( 86       ( 13 Jul 2024 05:48 )             40.4     07-13    143  |  104  |  10  ----------------------------<  100<H>  3.3<L>   |  23  |  0.6<L>    Ca    8.9      13 Jul 2024 05:48  Phos  3.9     07-13  Mg     1.8     07-13    TPro  6.7  /  Alb  3.9  /  TBili  1.0  /  DBili  0.3  /  AST  61<H>  /  ALT  49<H>  /  AlkPhos  93  07-13      Urinalysis Basic - ( 13 Jul 2024 05:48 )    Color: x / Appearance: x / SG: x / pH: x  Gluc: 100 mg/dL / Ketone: x  / Bili: x / Urobili: x   Blood: x / Protein: x / Nitrite: x   Leuk Esterase: x / RBC: x / WBC x   Sq Epi: x / Non Sq Epi: x / Bacteria: x      Drug Screen Urine:  Alcohol Level  Alcohol, Blood: <10 mg/dL (07-11-24 @ 15:03)        RADIOLOGY & ADDITIONAL STUDIES:

## 2024-07-14 LAB
ALBUMIN SERPL ELPH-MCNC: 3.8 G/DL — SIGNIFICANT CHANGE UP (ref 3.5–5.2)
ALP SERPL-CCNC: 86 U/L — SIGNIFICANT CHANGE UP (ref 30–115)
ALT FLD-CCNC: 40 U/L — SIGNIFICANT CHANGE UP (ref 0–41)
ANION GAP SERPL CALC-SCNC: 11 MMOL/L — SIGNIFICANT CHANGE UP (ref 7–14)
AST SERPL-CCNC: 50 U/L — HIGH (ref 0–41)
BASOPHILS # BLD AUTO: 0.03 K/UL — SIGNIFICANT CHANGE UP (ref 0–0.2)
BASOPHILS NFR BLD AUTO: 0.5 % — SIGNIFICANT CHANGE UP (ref 0–1)
BILIRUB DIRECT SERPL-MCNC: 0.3 MG/DL — SIGNIFICANT CHANGE UP (ref 0–0.3)
BILIRUB INDIRECT FLD-MCNC: 0.5 MG/DL — SIGNIFICANT CHANGE UP (ref 0.2–1.2)
BILIRUB SERPL-MCNC: 0.8 MG/DL — SIGNIFICANT CHANGE UP (ref 0.2–1.2)
BUN SERPL-MCNC: 10 MG/DL — SIGNIFICANT CHANGE UP (ref 10–20)
CALCIUM SERPL-MCNC: 8.9 MG/DL — SIGNIFICANT CHANGE UP (ref 8.4–10.5)
CHLORIDE SERPL-SCNC: 107 MMOL/L — SIGNIFICANT CHANGE UP (ref 98–110)
CO2 SERPL-SCNC: 22 MMOL/L — SIGNIFICANT CHANGE UP (ref 17–32)
CREAT SERPL-MCNC: 0.7 MG/DL — SIGNIFICANT CHANGE UP (ref 0.7–1.5)
EGFR: 111 ML/MIN/1.73M2 — SIGNIFICANT CHANGE UP
EOSINOPHIL # BLD AUTO: 0.02 K/UL — SIGNIFICANT CHANGE UP (ref 0–0.7)
EOSINOPHIL NFR BLD AUTO: 0.3 % — SIGNIFICANT CHANGE UP (ref 0–8)
GLUCOSE SERPL-MCNC: 95 MG/DL — SIGNIFICANT CHANGE UP (ref 70–99)
HCT VFR BLD CALC: 38.7 % — LOW (ref 42–52)
HGB BLD-MCNC: 13.2 G/DL — LOW (ref 14–18)
IMM GRANULOCYTES NFR BLD AUTO: 0.5 % — HIGH (ref 0.1–0.3)
LYMPHOCYTES # BLD AUTO: 2.01 K/UL — SIGNIFICANT CHANGE UP (ref 1.2–3.4)
LYMPHOCYTES # BLD AUTO: 32.5 % — SIGNIFICANT CHANGE UP (ref 20.5–51.1)
MAGNESIUM SERPL-MCNC: 1.8 MG/DL — SIGNIFICANT CHANGE UP (ref 1.8–2.4)
MCHC RBC-ENTMCNC: 33.7 PG — HIGH (ref 27–31)
MCHC RBC-ENTMCNC: 34.1 G/DL — SIGNIFICANT CHANGE UP (ref 32–37)
MCV RBC AUTO: 98.7 FL — HIGH (ref 80–94)
MONOCYTES # BLD AUTO: 0.94 K/UL — HIGH (ref 0.1–0.6)
MONOCYTES NFR BLD AUTO: 15.2 % — HIGH (ref 1.7–9.3)
NEUTROPHILS # BLD AUTO: 3.15 K/UL — SIGNIFICANT CHANGE UP (ref 1.4–6.5)
NEUTROPHILS NFR BLD AUTO: 51 % — SIGNIFICANT CHANGE UP (ref 42.2–75.2)
NRBC # BLD: 0 /100 WBCS — SIGNIFICANT CHANGE UP (ref 0–0)
PHOSPHATE SERPL-MCNC: 3.4 MG/DL — SIGNIFICANT CHANGE UP (ref 2.1–4.9)
PLATELET # BLD AUTO: 102 K/UL — LOW (ref 130–400)
PMV BLD: 11.2 FL — HIGH (ref 7.4–10.4)
POTASSIUM SERPL-MCNC: 3.5 MMOL/L — SIGNIFICANT CHANGE UP (ref 3.5–5)
POTASSIUM SERPL-SCNC: 3.5 MMOL/L — SIGNIFICANT CHANGE UP (ref 3.5–5)
PROT SERPL-MCNC: 6.7 G/DL — SIGNIFICANT CHANGE UP (ref 6–8)
RBC # BLD: 3.92 M/UL — LOW (ref 4.7–6.1)
RBC # FLD: 15.3 % — HIGH (ref 11.5–14.5)
SODIUM SERPL-SCNC: 140 MMOL/L — SIGNIFICANT CHANGE UP (ref 135–146)
WBC # BLD: 6.18 K/UL — SIGNIFICANT CHANGE UP (ref 4.8–10.8)
WBC # FLD AUTO: 6.18 K/UL — SIGNIFICANT CHANGE UP (ref 4.8–10.8)

## 2024-07-14 PROCEDURE — 99232 SBSQ HOSP IP/OBS MODERATE 35: CPT

## 2024-07-14 RX ADMIN — Medication 1 APPLICATION(S): at 06:41

## 2024-07-14 RX ADMIN — Medication 2 MILLIGRAM(S): at 10:21

## 2024-07-14 RX ADMIN — Medication 100 MILLIGRAM(S): at 10:21

## 2024-07-14 RX ADMIN — NICOTINE POLACRILEX 1 PATCH: 2 LOZENGE ORAL at 10:21

## 2024-07-14 RX ADMIN — ENOXAPARIN SODIUM 40 MILLIGRAM(S): 100 INJECTION SUBCUTANEOUS at 19:40

## 2024-07-14 RX ADMIN — LURASIDONE HYDROCHLORIDE 40 MILLIGRAM(S): 20 TABLET, FILM COATED ORAL at 17:08

## 2024-07-14 RX ADMIN — Medication 1 MILLIGRAM(S): at 10:21

## 2024-07-14 RX ADMIN — OLANZAPINE 10 MILLIGRAM(S): 2.5 TABLET, FILM COATED ORAL at 11:27

## 2024-07-15 LAB
ALBUMIN SERPL ELPH-MCNC: 4 G/DL — SIGNIFICANT CHANGE UP (ref 3.5–5.2)
ALP SERPL-CCNC: 90 U/L — SIGNIFICANT CHANGE UP (ref 30–115)
ALT FLD-CCNC: 49 U/L — HIGH (ref 0–41)
ANION GAP SERPL CALC-SCNC: 13 MMOL/L — SIGNIFICANT CHANGE UP (ref 7–14)
ANISOCYTOSIS BLD QL: SLIGHT — SIGNIFICANT CHANGE UP
AST SERPL-CCNC: 56 U/L — HIGH (ref 0–41)
BASOPHILS # BLD AUTO: 0 K/UL — SIGNIFICANT CHANGE UP (ref 0–0.2)
BASOPHILS NFR BLD AUTO: 0 % — SIGNIFICANT CHANGE UP (ref 0–1)
BILIRUB DIRECT SERPL-MCNC: 0.2 MG/DL — SIGNIFICANT CHANGE UP (ref 0–0.3)
BILIRUB INDIRECT FLD-MCNC: 0.5 MG/DL — SIGNIFICANT CHANGE UP (ref 0.2–1.2)
BILIRUB SERPL-MCNC: 0.7 MG/DL — SIGNIFICANT CHANGE UP (ref 0.2–1.2)
BUN SERPL-MCNC: 12 MG/DL — SIGNIFICANT CHANGE UP (ref 10–20)
CALCIUM SERPL-MCNC: 9.4 MG/DL — SIGNIFICANT CHANGE UP (ref 8.4–10.5)
CHLORIDE SERPL-SCNC: 105 MMOL/L — SIGNIFICANT CHANGE UP (ref 98–110)
CO2 SERPL-SCNC: 24 MMOL/L — SIGNIFICANT CHANGE UP (ref 17–32)
CREAT SERPL-MCNC: 0.7 MG/DL — SIGNIFICANT CHANGE UP (ref 0.7–1.5)
EGFR: 111 ML/MIN/1.73M2 — SIGNIFICANT CHANGE UP
EOSINOPHIL NFR BLD AUTO: 0 % — SIGNIFICANT CHANGE UP (ref 0–8)
GLUCOSE SERPL-MCNC: 108 MG/DL — HIGH (ref 70–99)
HCT VFR BLD CALC: 40.1 % — LOW (ref 42–52)
HGB BLD-MCNC: 13.5 G/DL — LOW (ref 14–18)
HYPOCHROMIA BLD QL: SLIGHT — SIGNIFICANT CHANGE UP
LYMPHOCYTES # BLD AUTO: 1.84 K/UL — SIGNIFICANT CHANGE UP (ref 1.2–3.4)
LYMPHOCYTES # BLD AUTO: 29 % — SIGNIFICANT CHANGE UP (ref 20.5–51.1)
MACROCYTES BLD QL: SLIGHT — SIGNIFICANT CHANGE UP
MAGNESIUM SERPL-MCNC: 1.9 MG/DL — SIGNIFICANT CHANGE UP (ref 1.8–2.4)
MANUAL SMEAR VERIFICATION: SIGNIFICANT CHANGE UP
MCHC RBC-ENTMCNC: 33.6 PG — HIGH (ref 27–31)
MCHC RBC-ENTMCNC: 33.7 G/DL — SIGNIFICANT CHANGE UP (ref 32–37)
MCV RBC AUTO: 99.8 FL — HIGH (ref 80–94)
MONOCYTES # BLD AUTO: 1.14 K/UL — HIGH (ref 0.1–0.6)
MONOCYTES NFR BLD AUTO: 18 % — HIGH (ref 1.7–9.3)
NEUTROPHILS # BLD AUTO: 3.35 K/UL — SIGNIFICANT CHANGE UP (ref 1.4–6.5)
NEUTROPHILS NFR BLD AUTO: 52 % — SIGNIFICANT CHANGE UP (ref 42.2–75.2)
NEUTS BAND # BLD: 1 % — SIGNIFICANT CHANGE UP (ref 0–6)
NRBC # BLD: 0 /100 WBCS — SIGNIFICANT CHANGE UP (ref 0–0)
NRBC # BLD: SIGNIFICANT CHANGE UP /100 WBCS (ref 0–0)
PHOSPHATE SERPL-MCNC: 4.2 MG/DL — SIGNIFICANT CHANGE UP (ref 2.1–4.9)
PLAT MORPH BLD: NORMAL — SIGNIFICANT CHANGE UP
PLATELET # BLD AUTO: 132 K/UL — SIGNIFICANT CHANGE UP (ref 130–400)
PLATELET COUNT - ESTIMATE: NORMAL — SIGNIFICANT CHANGE UP
PMV BLD: 10.6 FL — HIGH (ref 7.4–10.4)
POTASSIUM SERPL-MCNC: 3.8 MMOL/L — SIGNIFICANT CHANGE UP (ref 3.5–5)
POTASSIUM SERPL-SCNC: 3.8 MMOL/L — SIGNIFICANT CHANGE UP (ref 3.5–5)
PROT SERPL-MCNC: 7 G/DL — SIGNIFICANT CHANGE UP (ref 6–8)
RBC # BLD: 4.02 M/UL — LOW (ref 4.7–6.1)
RBC # FLD: 15 % — HIGH (ref 11.5–14.5)
RBC BLD AUTO: ABNORMAL
SODIUM SERPL-SCNC: 142 MMOL/L — SIGNIFICANT CHANGE UP (ref 135–146)
WBC # BLD: 6.33 K/UL — SIGNIFICANT CHANGE UP (ref 4.8–10.8)
WBC # FLD AUTO: 6.33 K/UL — SIGNIFICANT CHANGE UP (ref 4.8–10.8)

## 2024-07-15 PROCEDURE — 99232 SBSQ HOSP IP/OBS MODERATE 35: CPT

## 2024-07-15 RX ADMIN — ENOXAPARIN SODIUM 40 MILLIGRAM(S): 100 INJECTION SUBCUTANEOUS at 18:18

## 2024-07-15 RX ADMIN — NICOTINE POLACRILEX 1 PATCH: 2 LOZENGE ORAL at 10:00

## 2024-07-15 RX ADMIN — Medication 1 MILLIGRAM(S): at 12:51

## 2024-07-15 RX ADMIN — OLANZAPINE 10 MILLIGRAM(S): 2.5 TABLET, FILM COATED ORAL at 12:51

## 2024-07-15 RX ADMIN — NICOTINE POLACRILEX 1 PATCH: 2 LOZENGE ORAL at 12:51

## 2024-07-15 RX ADMIN — LURASIDONE HYDROCHLORIDE 40 MILLIGRAM(S): 20 TABLET, FILM COATED ORAL at 18:17

## 2024-07-15 RX ADMIN — Medication 1 APPLICATION(S): at 06:09

## 2024-07-15 RX ADMIN — Medication 100 MILLIGRAM(S): at 12:51

## 2024-07-15 RX ADMIN — NICOTINE POLACRILEX 1 PATCH: 2 LOZENGE ORAL at 07:43

## 2024-07-15 RX ADMIN — HYDROXYZINE PAMOATE 25 MILLIGRAM(S): 50 CAPSULE ORAL at 12:51

## 2024-07-15 RX ADMIN — NICOTINE POLACRILEX 1 PATCH: 2 LOZENGE ORAL at 18:43

## 2024-07-15 NOTE — DIETITIAN INITIAL EVALUATION ADULT - OTHER INFO
pt is 53 year old male with hx of asthma, PTSD, etoh abuse, psychosis p/w elevated LFT's, tremors and confusion. pt admitted with etoh withdrawal, alcoholic hepatitis

## 2024-07-15 NOTE — DIETITIAN INITIAL EVALUATION ADULT - PERTINENT LABORATORY DATA
07-15    142  |  105  |  12  ----------------------------<  108<H>  3.8   |  24  |  0.7    Ca    9.4      15 Jul 2024 07:34  Phos  4.2     07-15  Mg     1.9     07-15    TPro  7.0  /  Alb  4.0  /  TBili  0.7  /  DBili  0.2  /  AST  56<H>  /  ALT  49<H>  /  AlkPhos  90  07-15

## 2024-07-15 NOTE — DIETITIAN INITIAL EVALUATION ADULT - PERTINENT MEDS FT
MEDICATIONS  (STANDING):  chlorhexidine 2% Cloths 1 Application(s) Topical <User Schedule>  enoxaparin Injectable 40 milliGRAM(s) SubCutaneous every 24 hours  folic acid 1 milliGRAM(s) Oral daily  lurasidone 40 milliGRAM(s) Oral <User Schedule>  nicotine - 21 mG/24Hr(s) Patch 1 Patch Transdermal daily  OLANZapine 10 milliGRAM(s) Oral daily  sodium chloride 0.9%. 1000 milliLiter(s) (75 mL/Hr) IV Continuous <Continuous>  thiamine 100 milliGRAM(s) Oral daily    MEDICATIONS  (PRN):  aluminum hydroxide/magnesium hydroxide/simethicone Suspension 30 milliLiter(s) Oral every 4 hours PRN Dyspepsia  hydrOXYzine hydrochloride 25 milliGRAM(s) Oral every 6 hours PRN Anxiety  LORazepam   Injectable 2 milliGRAM(s) IV Push every 2 hours PRN Symptom-triggered: 2 point increase in CIWA -Ar score and a total score of 7 or LESS  melatonin 3 milliGRAM(s) Oral at bedtime PRN Insomnia  ondansetron Injectable 4 milliGRAM(s) IV Push every 8 hours PRN Nausea and/or Vomiting

## 2024-07-15 NOTE — DIETITIAN INITIAL EVALUATION ADULT - ORAL INTAKE PTA/DIET HISTORY
unable to assess pt with AMS. as per EMR review weight has been stable since june of 2020    presently on a regular diet tolerating well consumes >75% of meals

## 2024-07-16 LAB
ALBUMIN SERPL ELPH-MCNC: 4.2 G/DL — SIGNIFICANT CHANGE UP (ref 3.5–5.2)
ALP SERPL-CCNC: 85 U/L — SIGNIFICANT CHANGE UP (ref 30–115)
ALT FLD-CCNC: 59 U/L — HIGH (ref 0–41)
ANION GAP SERPL CALC-SCNC: 13 MMOL/L — SIGNIFICANT CHANGE UP (ref 7–14)
AST SERPL-CCNC: 74 U/L — HIGH (ref 0–41)
BASOPHILS # BLD AUTO: 0.03 K/UL — SIGNIFICANT CHANGE UP (ref 0–0.2)
BASOPHILS NFR BLD AUTO: 0.4 % — SIGNIFICANT CHANGE UP (ref 0–1)
BILIRUB SERPL-MCNC: 0.7 MG/DL — SIGNIFICANT CHANGE UP (ref 0.2–1.2)
BUN SERPL-MCNC: 11 MG/DL — SIGNIFICANT CHANGE UP (ref 10–20)
CALCIUM SERPL-MCNC: 9.4 MG/DL — SIGNIFICANT CHANGE UP (ref 8.4–10.5)
CHLORIDE SERPL-SCNC: 106 MMOL/L — SIGNIFICANT CHANGE UP (ref 98–110)
CO2 SERPL-SCNC: 22 MMOL/L — SIGNIFICANT CHANGE UP (ref 17–32)
CREAT SERPL-MCNC: 0.6 MG/DL — LOW (ref 0.7–1.5)
EGFR: 116 ML/MIN/1.73M2 — SIGNIFICANT CHANGE UP
EOSINOPHIL # BLD AUTO: 0.02 K/UL — SIGNIFICANT CHANGE UP (ref 0–0.7)
EOSINOPHIL NFR BLD AUTO: 0.3 % — SIGNIFICANT CHANGE UP (ref 0–8)
GLUCOSE SERPL-MCNC: 114 MG/DL — HIGH (ref 70–99)
HCT VFR BLD CALC: 41.9 % — LOW (ref 42–52)
HGB BLD-MCNC: 14.1 G/DL — SIGNIFICANT CHANGE UP (ref 14–18)
IMM GRANULOCYTES NFR BLD AUTO: 0.7 % — HIGH (ref 0.1–0.3)
LYMPHOCYTES # BLD AUTO: 1.8 K/UL — SIGNIFICANT CHANGE UP (ref 1.2–3.4)
LYMPHOCYTES # BLD AUTO: 26.8 % — SIGNIFICANT CHANGE UP (ref 20.5–51.1)
MAGNESIUM SERPL-MCNC: 1.9 MG/DL — SIGNIFICANT CHANGE UP (ref 1.8–2.4)
MCHC RBC-ENTMCNC: 33.3 PG — HIGH (ref 27–31)
MCHC RBC-ENTMCNC: 33.7 G/DL — SIGNIFICANT CHANGE UP (ref 32–37)
MCV RBC AUTO: 99.1 FL — HIGH (ref 80–94)
MONOCYTES # BLD AUTO: 1.29 K/UL — HIGH (ref 0.1–0.6)
MONOCYTES NFR BLD AUTO: 19.2 % — HIGH (ref 1.7–9.3)
NEUTROPHILS # BLD AUTO: 3.53 K/UL — SIGNIFICANT CHANGE UP (ref 1.4–6.5)
NEUTROPHILS NFR BLD AUTO: 52.6 % — SIGNIFICANT CHANGE UP (ref 42.2–75.2)
NRBC # BLD: 0 /100 WBCS — SIGNIFICANT CHANGE UP (ref 0–0)
PHOSPHATE SERPL-MCNC: 4.4 MG/DL — SIGNIFICANT CHANGE UP (ref 2.1–4.9)
PLATELET # BLD AUTO: 153 K/UL — SIGNIFICANT CHANGE UP (ref 130–400)
PMV BLD: 10.8 FL — HIGH (ref 7.4–10.4)
POTASSIUM SERPL-MCNC: 3.9 MMOL/L — SIGNIFICANT CHANGE UP (ref 3.5–5)
POTASSIUM SERPL-SCNC: 3.9 MMOL/L — SIGNIFICANT CHANGE UP (ref 3.5–5)
PROT SERPL-MCNC: 7 G/DL — SIGNIFICANT CHANGE UP (ref 6–8)
RBC # BLD: 4.23 M/UL — LOW (ref 4.7–6.1)
RBC # FLD: 14.8 % — HIGH (ref 11.5–14.5)
SODIUM SERPL-SCNC: 141 MMOL/L — SIGNIFICANT CHANGE UP (ref 135–146)
WBC # BLD: 6.72 K/UL — SIGNIFICANT CHANGE UP (ref 4.8–10.8)
WBC # FLD AUTO: 6.72 K/UL — SIGNIFICANT CHANGE UP (ref 4.8–10.8)

## 2024-07-16 PROCEDURE — 70551 MRI BRAIN STEM W/O DYE: CPT | Mod: 26

## 2024-07-16 PROCEDURE — 99222 1ST HOSP IP/OBS MODERATE 55: CPT

## 2024-07-16 PROCEDURE — 99231 SBSQ HOSP IP/OBS SF/LOW 25: CPT

## 2024-07-16 PROCEDURE — 99232 SBSQ HOSP IP/OBS MODERATE 35: CPT

## 2024-07-16 RX ORDER — THIAMINE HCL 100 MG
500 TABLET ORAL DAILY
Refills: 0 | Status: DISCONTINUED | OUTPATIENT
Start: 2024-07-16 | End: 2024-07-18

## 2024-07-16 RX ADMIN — NICOTINE POLACRILEX 1 PATCH: 2 LOZENGE ORAL at 11:46

## 2024-07-16 RX ADMIN — HYDROXYZINE PAMOATE 25 MILLIGRAM(S): 50 CAPSULE ORAL at 12:01

## 2024-07-16 RX ADMIN — NICOTINE POLACRILEX 1 PATCH: 2 LOZENGE ORAL at 11:55

## 2024-07-16 RX ADMIN — HYDROXYZINE PAMOATE 25 MILLIGRAM(S): 50 CAPSULE ORAL at 18:26

## 2024-07-16 RX ADMIN — NICOTINE POLACRILEX 1 PATCH: 2 LOZENGE ORAL at 19:50

## 2024-07-16 RX ADMIN — Medication 105 MILLIGRAM(S): at 18:27

## 2024-07-16 RX ADMIN — OLANZAPINE 10 MILLIGRAM(S): 2.5 TABLET, FILM COATED ORAL at 11:47

## 2024-07-16 RX ADMIN — LURASIDONE HYDROCHLORIDE 40 MILLIGRAM(S): 20 TABLET, FILM COATED ORAL at 18:27

## 2024-07-16 RX ADMIN — Medication 1 MILLIGRAM(S): at 11:47

## 2024-07-16 RX ADMIN — NICOTINE POLACRILEX 1 PATCH: 2 LOZENGE ORAL at 07:32

## 2024-07-16 RX ADMIN — Medication 1 TABLET(S): at 11:47

## 2024-07-16 RX ADMIN — ENOXAPARIN SODIUM 40 MILLIGRAM(S): 100 INJECTION SUBCUTANEOUS at 18:27

## 2024-07-16 NOTE — PROGRESS NOTE ADULT - PROBLEM SELECTOR PLAN 1
After evaluation at this time protocol completed and stable . Pts concerns addressed  Pt will be monitored and supportive care provided.  No other changes to medical care plan for withdrawals.  Monitor labs/electrolytes as needed.    -Counseling provided   CATCH team involved for aftercare and pt will follow up with aftercare directly to rehab when medically cleared for discharge

## 2024-07-16 NOTE — CONSULT NOTE ADULT - SUBJECTIVE AND OBJECTIVE BOX
Neurology Consult  Pt is a 53M with h/o EOTH use d/o, PMH of asthma, PPH of psych disorder, likely PTSD s/p 9/11 clean-up, was sent to ED by PCP for evaluation due to elevated LFTs, bilirubin and, elevated Ca-19-9 - underlying malignancy ruled out at this point. Pt admitted for ETOH WD and LFTs have been downtrending while on ativan taper protocol.  Per wife, patient has been having intermittent confusion and falls PTA, last 1-2 weeks ago. Neuro consulted for r/o wernicke-korsakoff syndrome. CTH showing atrophy out of proportion for age.       HPI:  pt is a 53-year-old male with past medical history of asthma, PTSD, EtOH abuse, and psychosis who presented to ER with wife for concerns of abnormal blood work. Pt states his latest blood work showed elevated LFTs and today he complained of  tremor and confusion. Pt states his last drink was 4 days ago, states he usually drinks daily, drinks a handle of vodka (1.75 L) over 3 days. Denies fever, chills, cp, n/v/d, abdominal pain, dysuria.  Upon discussion with patient regarding history of pancreatic CA, pt states he was not diagnosed with cancer but was going to see a doctor regarding his elevated LFTs           PAST MEDICAL & SURGICAL HISTORY:  PTSD (post-traumatic stress disorder)  Asthma, unspecified asthma severity, unspecified whether complicated, unspecified whether persistent  No significant past surgical history        Social History: (-) x 3    Allergies  No Known Allergies        MEDICATIONS  (STANDING):  chlorhexidine 2% Cloths 1 Application(s) Topical <User Schedule>  enoxaparin Injectable 40 milliGRAM(s) SubCutaneous every 24 hours  folic acid 1 milliGRAM(s) Oral daily  lurasidone 40 milliGRAM(s) Oral <User Schedule>  multivitamin 1 Tablet(s) Oral daily  nicotine - 21 mG/24Hr(s) Patch 1 Patch Transdermal daily  OLANZapine 10 milliGRAM(s) Oral daily  thiamine IVPB 500 milliGRAM(s) IV Intermittent daily    MEDICATIONS  (PRN):  aluminum hydroxide/magnesium hydroxide/simethicone Suspension 30 milliLiter(s) Oral every 4 hours PRN Dyspepsia  hydrOXYzine hydrochloride 25 milliGRAM(s) Oral every 6 hours PRN Anxiety  LORazepam   Injectable 2 milliGRAM(s) IV Push every 2 hours PRN Symptom-triggered: 2 point increase in CIWA -Ar score and a total score of 7 or LESS  melatonin 3 milliGRAM(s) Oral at bedtime PRN Insomnia  ondansetron Injectable 4 milliGRAM(s) IV Push every 8 hours PRN Nausea and/or Vomiting      Review of systems:    Constitutional: as per HPI  Eyes: No eye pain or discharge  ENMT:  No difficulty hearing; No sinus or throat pain  Neck: No pain or stiffness  Respiratory: No cough, wheezing, chills or hemoptysis  Cardiovascular: No chest pain, palpitations, shortness of breath, dyspnea on exertion  Gastrointestinal: No abdominal pain, nausea, vomiting or hematemesis; No diarrhea or constipation.   Genitourinary: No dysuria, frequency, hematuria or incontinence  Neurological: As per HPI  Skin: No rashes or lesions   Endocrine: No heat or cold intolerance; No hair loss  Musculoskeletal: No joint pain or swelling  Psychiatric: No depression, anxiety, mood swings  Heme/Lymph: No easy bruising or bleeding gums    Vital Signs Last 24 Hrs  T(C): 36.6 (16 Jul 2024 05:27), Max: 36.6 (15 Jul 2024 13:28)  T(F): 97.8 (16 Jul 2024 05:27), Max: 97.9 (15 Jul 2024 13:28)  HR: 65 (16 Jul 2024 05:27) (64 - 65)  BP: 136/88 (16 Jul 2024 05:27) (122/79 - 136/88)  BP(mean): --  RR: 18 (16 Jul 2024 05:27) (18 - 18)  SpO2: 98% (16 Jul 2024 05:27) (98% - 98%)    Parameters below as of 16 Jul 2024 05:27  Patient On (Oxygen Delivery Method): room air        Examination:  General:  Appearance is consistent with chronologic age.  No abnormal facies.  Gross skin survey within normal limits.    Cognitive/Language: Alert and oriented to person, place, time and situation.  Recent and remote memory impaired.  Language with normal repetition, comprehension and naming.  Nondysarthric.    Eyes: intact VA, VFF.  EOMI w/o nystagmus, skew or reported double vision.  PERRL.  No ptosis/weakness of eyelid closure.    Face:  Facial sensation normal V1 - 3, no facial asymmetry.   Motor examination:   Normal tone, bulk and range of motion.  No tenderness, twitching, tremors or involuntary movements.  Formal Muscle Strength Testing: (MRC grade R/L) 5/5 UE; 5/5 LE.  No observable drift.  Sensory examination: Intact to light touch in all extremities.  Cerebellum:   FTN/HKS intact with normal DAYRON in all limbs.  No dysmetria or dysdiadokinesia.         Labs:                         14.1   6.72  )-----------( 153      ( 16 Jul 2024 07:47 )             41.9       07-16    141  |  106  |  11  ----------------------------<  114<H>  3.9   |  22  |  0.6<L>    Ca    9.4      16 Jul 2024 07:47  Phos  4.4     07-16  Mg     1.9     07-16    TPro  7.0  /  Alb  4.2  /  TBili  0.7  /  DBili  x   /  AST  74<H>  /  ALT  59<H>  /  AlkPhos  85  07-16    LIVER FUNCTIONS - ( 16 Jul 2024 07:47 )  Alb: 4.2 g/dL / Pro: 7.0 g/dL / ALK PHOS: 85 U/L / ALT: 59 U/L / AST: 74 U/L / GGT: x             Urinalysis Basic - ( 16 Jul 2024 07:47 )    Color: x / Appearance: x / SG: x / pH: x  Gluc: 114 mg/dL / Ketone: x  / Bili: x / Urobili: x   Blood: x / Protein: x / Nitrite: x   Leuk Esterase: x / RBC: x / WBC x   Sq Epi: x / Non Sq Epi: x / Bacteria: x      < from: CT Head No Cont (07.11.24 @ 16:13) >  IMPRESSION:  No acute intracranial pathology.    Generalized atrophy out of proportion to the patient's stated age,   progressed since the prior CT.    < end of copied text >     Neurology Consult  Pt is a 53M with h/o EOTH use d/o, PMH of asthma, PPH of psych disorder, likely PTSD s/p 9/11 clean-up, was sent to ED by PCP for evaluation due to elevated LFTs, bilirubin and, elevated Ca-19-9 - underlying malignancy ruled out at this point. Pt admitted for ETOH WD and LFTs have been downtrending while on ativan taper protocol.  Per wife, patient has been having intermittent confusion and falls PTA, last 1-2 weeks ago. Neuro consulted for r/o wernicke-korsakoff syndrome. CTH showing atrophy out of proportion for age.       HPI:  pt is a 53-year-old male with past medical history of asthma, PTSD, EtOH abuse, and psychosis who presented to ER with wife for concerns of abnormal blood work. Pt states his latest blood work showed elevated LFTs and today he complained of  tremor and confusion. Pt states his last drink was 4 days ago, states he usually drinks daily, drinks a handle of vodka (1.75 L) over 3 days. Denies fever, chills, cp, n/v/d, abdominal pain, dysuria.  Upon discussion with patient regarding history of pancreatic CA, pt states he was not diagnosed with cancer but was going to see a doctor regarding his elevated LFTs           PAST MEDICAL & SURGICAL HISTORY:  PTSD (post-traumatic stress disorder)  Asthma, unspecified asthma severity, unspecified whether complicated, unspecified whether persistent  No significant past surgical history        Social History: (-) x 3    Allergies  No Known Allergies        MEDICATIONS  (STANDING):  chlorhexidine 2% Cloths 1 Application(s) Topical <User Schedule>  enoxaparin Injectable 40 milliGRAM(s) SubCutaneous every 24 hours  folic acid 1 milliGRAM(s) Oral daily  lurasidone 40 milliGRAM(s) Oral <User Schedule>  multivitamin 1 Tablet(s) Oral daily  nicotine - 21 mG/24Hr(s) Patch 1 Patch Transdermal daily  OLANZapine 10 milliGRAM(s) Oral daily  thiamine IVPB 500 milliGRAM(s) IV Intermittent daily    MEDICATIONS  (PRN):  aluminum hydroxide/magnesium hydroxide/simethicone Suspension 30 milliLiter(s) Oral every 4 hours PRN Dyspepsia  hydrOXYzine hydrochloride 25 milliGRAM(s) Oral every 6 hours PRN Anxiety  LORazepam   Injectable 2 milliGRAM(s) IV Push every 2 hours PRN Symptom-triggered: 2 point increase in CIWA -Ar score and a total score of 7 or LESS  melatonin 3 milliGRAM(s) Oral at bedtime PRN Insomnia  ondansetron Injectable 4 milliGRAM(s) IV Push every 8 hours PRN Nausea and/or Vomiting      Review of systems:    Constitutional: as per HPI  Eyes: No eye pain or discharge  ENMT:  No difficulty hearing; No sinus or throat pain  Neck: No pain or stiffness  Respiratory: No cough, wheezing, chills or hemoptysis  Cardiovascular: No chest pain, palpitations, shortness of breath, dyspnea on exertion  Gastrointestinal: No abdominal pain, nausea, vomiting or hematemesis; No diarrhea or constipation.   Genitourinary: No dysuria, frequency, hematuria or incontinence  Neurological: As per HPI  Skin: No rashes or lesions   Endocrine: No heat or cold intolerance; No hair loss  Musculoskeletal: No joint pain or swelling  Psychiatric: No depression, anxiety, mood swings  Heme/Lymph: No easy bruising or bleeding gums    Vital Signs Last 24 Hrs  T(C): 36.6 (16 Jul 2024 05:27), Max: 36.6 (15 Jul 2024 13:28)  T(F): 97.8 (16 Jul 2024 05:27), Max: 97.9 (15 Jul 2024 13:28)  HR: 65 (16 Jul 2024 05:27) (64 - 65)  BP: 136/88 (16 Jul 2024 05:27) (122/79 - 136/88)  BP(mean): --  RR: 18 (16 Jul 2024 05:27) (18 - 18)  SpO2: 98% (16 Jul 2024 05:27) (98% - 98%)    Parameters below as of 16 Jul 2024 05:27  Patient On (Oxygen Delivery Method): room air        Examination:  General:  Appearance is consistent with chronologic age.  No abnormal facies.  Gross skin survey within normal limits.    Cognitive/Language: Alert and oriented to person, place, time and situation.  Recent memory impaired. remote memory intact.  Language with normal repetition, comprehension and naming.  Nondysarthric.    Eyes: intact VA, VFF.  EOMI w/o nystagmus, skew or reported double vision.  PERRL.  No ptosis/weakness of eyelid closure.    Face:  Facial sensation normal V1 - 3, no facial asymmetry.   Motor examination:   Normal tone, bulk and range of motion.  No tenderness, twitching, tremors or involuntary movements.  Formal Muscle Strength Testing: (MRC grade R/L) 5/5 UE; 5/5 LE.  No observable drift.  Sensory examination: Intact to light touch in all extremities.  Cerebellum:   FTN/HKS intact with normal DAYRON in all limbs.  No dysmetria or dysdiadokinesia.   +asterixis         Labs:                         14.1   6.72  )-----------( 153      ( 16 Jul 2024 07:47 )             41.9       07-16    141  |  106  |  11  ----------------------------<  114<H>  3.9   |  22  |  0.6<L>    Ca    9.4      16 Jul 2024 07:47  Phos  4.4     07-16  Mg     1.9     07-16    TPro  7.0  /  Alb  4.2  /  TBili  0.7  /  DBili  x   /  AST  74<H>  /  ALT  59<H>  /  AlkPhos  85  07-16    LIVER FUNCTIONS - ( 16 Jul 2024 07:47 )  Alb: 4.2 g/dL / Pro: 7.0 g/dL / ALK PHOS: 85 U/L / ALT: 59 U/L / AST: 74 U/L / GGT: x             Urinalysis Basic - ( 16 Jul 2024 07:47 )    Color: x / Appearance: x / SG: x / pH: x  Gluc: 114 mg/dL / Ketone: x  / Bili: x / Urobili: x   Blood: x / Protein: x / Nitrite: x   Leuk Esterase: x / RBC: x / WBC x   Sq Epi: x / Non Sq Epi: x / Bacteria: x      < from: CT Head No Cont (07.11.24 @ 16:13) >  IMPRESSION:  No acute intracranial pathology.    Generalized atrophy out of proportion to the patient's stated age,   progressed since the prior CT.    < end of copied text >

## 2024-07-16 NOTE — CONSULT NOTE ADULT - NS ATTEND AMEND GEN_ALL_CORE FT
Patient seen and examined and agree with above except as noted.  Patients history, notes, labs, imaging, vitals and meds reviewed personally.  a+Ox4 able to give POTUS, able to do simple math  No confabulation on testing  EOMI, VFF, no dysmetria  Gait steady no ataxia    Plan as above  Can obtain MRI brain w/o MARBELLA  NO contraindication to thiamine supplementation however current assessment does not support Wernicke's Korsakoff syndrome

## 2024-07-16 NOTE — PROGRESS NOTE ADULT - ASSESSMENT
53-year-old male with past medical history of asthma, PTSD, EtOH abuse, and psychosis p/w etoh withdrawal and elevated LFTs.     # etoh withdrawal  # elevated LFTs   # h/o etoh abuse   - CIWA symptom-triggered Ativan protocol given LFTs  - thiamine/ folate/ IVF   - trend LFTs  - hepatitis panel, ggt. RUQ US when patient stable  - CT:  Hepatic steatosis; no biliary ductal dilation. Few nonspecific prominent upper abdominal lymph nodes.    # magnesium deficiency  - replace and repeat Mag PRN    # tobacco use   - smoking cessation, nicotine replacement     #h/o PTSD, psychosis   - c/w zyprexa, latuda     #dvt ppx  # gi ppx   # full code   
53-year-old male with past medical history of asthma, PTSD, EtOH abuse, and psychosis p/w etoh withdrawal and elevated LFTs.     # etoh withdrawal  # elevated LFTs   # h/o etoh abuse   - CIWA symptom-triggered Ativan protocol given LFTs  - thiamine/ folate/MV  - trend LFTs-normalized.  ggt elevated, hepatitis panel neg, will get RUQ US when off CIWA  - CT:  Hepatic steatosis; no biliary ductal dilation. Few nonspecific prominent upper abdominal lymph nodes.  - as per wife, patient has been confused and unsteady on his feet for "quite some time".  Will get thiamine level, consult neurology.    # magnesium deficiency  - replace and repeat Mag PRN    # tobacco use   - smoking cessation, nicotine replacement     #h/o PTSD, psychosis   - c/w zyprexa, latuda     #dvt ppx  # gi ppx   # full code 
53-year-old male with past medical history of asthma, PTSD, EtOH abuse, and psychosis p/w etoh withdrawal and elevated LFTs.     # etoh withdrawal  # elevated LFTs   # h/o etoh abuse   - CIWA symptom-triggered Ativan protocol given LFTs  - thiamine/ folate/MV  - trend LFTs-normalized.  ggt elevated, hepatitis panel neg  - CT:  Hepatic steatosis; no biliary ductal dilation. Few nonspecific prominent upper abdominal lymph nodes.  - as per wife, patient has been confused and unsteady on his feet for "quite some time".  -Neurology consult appreciated.  Can try IV thiamine x 5 days and monitor for improvement, get MR brain.  Low suspicion for WE per neurology.    # magnesium deficiency  - replace and repeat Mag PRN    # tobacco use   - smoking cessation, nicotine replacement     #h/o PTSD, psychosis   - c/w zyprexa, latuda     #dvt ppx  # gi ppx   # full code     Dispo:  Patient presented in ETOH withdrawal, only needed symptom-triggered CIWA, has not needed Ativan in >24h.  Pt's symptoms (unsteady gait, memory issues) persisted, suspected possible WE and started IV thiamine.  Follow MR brain, monitor for symptoms improvement, discharge to inpatient ETOH detox when stable.

## 2024-07-16 NOTE — CONSULT NOTE ADULT - ASSESSMENT
ASSESSMENT: Pt is a 53M with h/o EOTH use d/o, PMH of asthma, PPH of psych disorder, likely PTSD s/p 9/11 clean-up, was sent to ED by PCP for evaluation due to elevated LFTs, bilirubin and, elevated Ca-19-9 - underlying malignancy ruled out at this point. Pt admitted for ETOH WD and LFTs have been downtrending while on ativan taper protocol.  Per wife, patient has been having intermittent confusion and falls PTA, last 1-2 weeks ago. Neuro consulted for r/o wernicke-korsakoff syndrome. CTH showing atrophy out of proportion for age.       PLAN: Case d/w Dr. Ojeda  NOTE INCOMPLETE  ASSESSMENT: Pt is a 53M with h/o EOTH use d/o, PMH of asthma, PPH of psych disorder, likely PTSD s/p 9/11 clean-up, was sent to ED by PCP for evaluation due to elevated LFTs, bilirubin and, elevated Ca-19-9 - underlying malignancy ruled out at this point. Pt admitted for ETOH WD and LFTs have been downtrending while on ativan taper protocol.  Per wife, patient has been having intermittent confusion and falls PTA, last 1-2 weeks ago. Neuro consulted for r/o wernicke-korsakoff syndrome. CTH showing atrophy out of proportion for age.       PLAN: Case d/w Dr. Ojeda  - Recommend MRI Brain w/o contrast  - Agree with thiamine supplementation

## 2024-07-17 LAB
ALBUMIN SERPL ELPH-MCNC: 4.2 G/DL — SIGNIFICANT CHANGE UP (ref 3.5–5.2)
ALP SERPL-CCNC: 87 U/L — SIGNIFICANT CHANGE UP (ref 30–115)
ALT FLD-CCNC: 69 U/L — HIGH (ref 0–41)
ANION GAP SERPL CALC-SCNC: 13 MMOL/L — SIGNIFICANT CHANGE UP (ref 7–14)
AST SERPL-CCNC: 85 U/L — HIGH (ref 0–41)
BASOPHILS # BLD AUTO: 0.04 K/UL — SIGNIFICANT CHANGE UP (ref 0–0.2)
BASOPHILS NFR BLD AUTO: 0.5 % — SIGNIFICANT CHANGE UP (ref 0–1)
BILIRUB SERPL-MCNC: 0.6 MG/DL — SIGNIFICANT CHANGE UP (ref 0.2–1.2)
BUN SERPL-MCNC: 13 MG/DL — SIGNIFICANT CHANGE UP (ref 10–20)
CALCIUM SERPL-MCNC: 9.2 MG/DL — SIGNIFICANT CHANGE UP (ref 8.4–10.5)
CHLORIDE SERPL-SCNC: 107 MMOL/L — SIGNIFICANT CHANGE UP (ref 98–110)
CO2 SERPL-SCNC: 20 MMOL/L — SIGNIFICANT CHANGE UP (ref 17–32)
CREAT SERPL-MCNC: 0.6 MG/DL — LOW (ref 0.7–1.5)
EGFR: 116 ML/MIN/1.73M2 — SIGNIFICANT CHANGE UP
EOSINOPHIL # BLD AUTO: 0.01 K/UL — SIGNIFICANT CHANGE UP (ref 0–0.7)
EOSINOPHIL NFR BLD AUTO: 0.1 % — SIGNIFICANT CHANGE UP (ref 0–8)
GLUCOSE SERPL-MCNC: 98 MG/DL — SIGNIFICANT CHANGE UP (ref 70–99)
HCT VFR BLD CALC: 42.4 % — SIGNIFICANT CHANGE UP (ref 42–52)
HGB BLD-MCNC: 14.2 G/DL — SIGNIFICANT CHANGE UP (ref 14–18)
IMM GRANULOCYTES NFR BLD AUTO: 0.9 % — HIGH (ref 0.1–0.3)
LYMPHOCYTES # BLD AUTO: 2.04 K/UL — SIGNIFICANT CHANGE UP (ref 1.2–3.4)
LYMPHOCYTES # BLD AUTO: 27.1 % — SIGNIFICANT CHANGE UP (ref 20.5–51.1)
MAGNESIUM SERPL-MCNC: 2.1 MG/DL — SIGNIFICANT CHANGE UP (ref 1.8–2.4)
MCHC RBC-ENTMCNC: 33.3 PG — HIGH (ref 27–31)
MCHC RBC-ENTMCNC: 33.5 G/DL — SIGNIFICANT CHANGE UP (ref 32–37)
MCV RBC AUTO: 99.3 FL — HIGH (ref 80–94)
MONOCYTES # BLD AUTO: 1.57 K/UL — HIGH (ref 0.1–0.6)
MONOCYTES NFR BLD AUTO: 20.9 % — HIGH (ref 1.7–9.3)
NEUTROPHILS # BLD AUTO: 3.79 K/UL — SIGNIFICANT CHANGE UP (ref 1.4–6.5)
NEUTROPHILS NFR BLD AUTO: 50.5 % — SIGNIFICANT CHANGE UP (ref 42.2–75.2)
NRBC # BLD: 0 /100 WBCS — SIGNIFICANT CHANGE UP (ref 0–0)
PHOSPHATE SERPL-MCNC: 4.3 MG/DL — SIGNIFICANT CHANGE UP (ref 2.1–4.9)
PLATELET # BLD AUTO: 186 K/UL — SIGNIFICANT CHANGE UP (ref 130–400)
PMV BLD: 10.3 FL — SIGNIFICANT CHANGE UP (ref 7.4–10.4)
POTASSIUM SERPL-MCNC: 4.2 MMOL/L — SIGNIFICANT CHANGE UP (ref 3.5–5)
POTASSIUM SERPL-SCNC: 4.2 MMOL/L — SIGNIFICANT CHANGE UP (ref 3.5–5)
PROT SERPL-MCNC: 7.2 G/DL — SIGNIFICANT CHANGE UP (ref 6–8)
RBC # BLD: 4.27 M/UL — LOW (ref 4.7–6.1)
RBC # FLD: 14.9 % — HIGH (ref 11.5–14.5)
SODIUM SERPL-SCNC: 140 MMOL/L — SIGNIFICANT CHANGE UP (ref 135–146)
WBC # BLD: 7.52 K/UL — SIGNIFICANT CHANGE UP (ref 4.8–10.8)
WBC # FLD AUTO: 7.52 K/UL — SIGNIFICANT CHANGE UP (ref 4.8–10.8)

## 2024-07-17 PROCEDURE — 99233 SBSQ HOSP IP/OBS HIGH 50: CPT

## 2024-07-17 RX ADMIN — HYDROXYZINE PAMOATE 25 MILLIGRAM(S): 50 CAPSULE ORAL at 21:13

## 2024-07-17 RX ADMIN — Medication 1 MILLIGRAM(S): at 11:30

## 2024-07-17 RX ADMIN — NICOTINE POLACRILEX 1 PATCH: 2 LOZENGE ORAL at 11:30

## 2024-07-17 RX ADMIN — OLANZAPINE 10 MILLIGRAM(S): 2.5 TABLET, FILM COATED ORAL at 11:30

## 2024-07-17 RX ADMIN — NICOTINE POLACRILEX 1 PATCH: 2 LOZENGE ORAL at 11:32

## 2024-07-17 RX ADMIN — Medication 1 TABLET(S): at 11:30

## 2024-07-17 RX ADMIN — Medication 105 MILLIGRAM(S): at 11:36

## 2024-07-17 RX ADMIN — LURASIDONE HYDROCHLORIDE 40 MILLIGRAM(S): 20 TABLET, FILM COATED ORAL at 17:03

## 2024-07-17 RX ADMIN — HYDROXYZINE PAMOATE 25 MILLIGRAM(S): 50 CAPSULE ORAL at 14:07

## 2024-07-17 RX ADMIN — ENOXAPARIN SODIUM 40 MILLIGRAM(S): 100 INJECTION SUBCUTANEOUS at 17:05

## 2024-07-17 RX ADMIN — NICOTINE POLACRILEX 1 PATCH: 2 LOZENGE ORAL at 19:22

## 2024-07-17 NOTE — PHYSICAL THERAPY INITIAL EVALUATION ADULT - GENERAL OBSERVATIONS, REHAB EVAL
13;05-13;25 pt was seen for PT IE at bed side, pt is agreeable, chart thoroughly reviewed, RN Suma is aware.  Pt received and left semi vallejo in bed, in no apparent distress, +hep lock, +call bell within reach, bed side table at reach

## 2024-07-17 NOTE — PHYSICAL THERAPY INITIAL EVALUATION ADULT - CRITERIA FOR SKILLED THERAPEUTIC INTERVENTIONS
Based on the findings pt is at base line functional level for amb and transfers, demonstrated good balance with ADLs and will be d/c from PT

## 2024-07-17 NOTE — PHYSICAL THERAPY INITIAL EVALUATION ADULT - PERTINENT HX OF CURRENT PROBLEM, REHAB EVAL
pt is a 53-year-old male with past medical history of asthma, PTSD, EtOH abuse, and psychosis who presented to ER with wife for concerns of abnormal blood work. Pt states his latest blood work showed elevated LFTs and today he complained of  tremor and confusion. Pt states his last drink was 4 days ago, states he usually drinks daily, drinks a handle of vodka (1.75 L) over 3 days. Denies fever, chills, cp, n/v/d, abdominal pain, dysuria.  Upon discussion with patient regarding history of pancreatic CA, pt states he was not diagnosed with cancer but was going to see a doctor regarding his elevated LFTs

## 2024-07-17 NOTE — PROGRESS NOTE ADULT - SUBJECTIVE AND OBJECTIVE BOX
53-year-old male with past medical history of asthma, PTSD, EtOH abuse, and psychosis p/w etoh withdrawal and elevated LFTs.     Today:  Seen at bedside, no new complaints.              REVIEW OF SYSTEMS:  No new complaints      MEDICATIONS  (STANDING):  chlorhexidine 2% Cloths 1 Application(s) Topical <User Schedule>  enoxaparin Injectable 40 milliGRAM(s) SubCutaneous every 24 hours  folic acid 1 milliGRAM(s) Oral daily  lurasidone 40 milliGRAM(s) Oral <User Schedule>  nicotine - 21 mG/24Hr(s) Patch 1 Patch Transdermal daily  OLANZapine 10 milliGRAM(s) Oral daily  sodium chloride 0.9%. 1000 milliLiter(s) (75 mL/Hr) IV Continuous <Continuous>  thiamine 100 milliGRAM(s) Oral daily    MEDICATIONS  (PRN):  aluminum hydroxide/magnesium hydroxide/simethicone Suspension 30 milliLiter(s) Oral every 4 hours PRN Dyspepsia  hydrOXYzine hydrochloride 25 milliGRAM(s) Oral every 6 hours PRN Anxiety  LORazepam   Injectable 2 milliGRAM(s) IV Push every 2 hours PRN Symptom-triggered: 2 point increase in CIWA -Ar score and a total score of 7 or LESS  melatonin 3 milliGRAM(s) Oral at bedtime PRN Insomnia  ondansetron Injectable 4 milliGRAM(s) IV Push every 8 hours PRN Nausea and/or Vomiting      Allergies  No Known Allergies        Vital Signs Last 24 Hrs  T(C): 36.6 (15 Jul 2024 13:28), Max: 36.7 (14 Jul 2024 20:49)  T(F): 97.9 (15 Jul 2024 13:28), Max: 98.1 (14 Jul 2024 20:49)  HR: 64 (15 Jul 2024 13:28) (60 - 64)  BP: 122/79 (15 Jul 2024 13:28) (122/79 - 146/89)  RR: 18 (14 Jul 2024 20:49) (18 - 18)  SpO2: 98% (15 Jul 2024 13:28) (97% - 98%)        PHYSICAL EXAM:  GENERAL: NAD, well-groomed, well-developed  HEAD:  Atraumatic, Normocephalic  EYES: EOMI, PERRLA, conjunctiva and sclera clear  ENMT: No tonsillar erythema, exudates, or enlargement; Moist mucous membranes, Good dentition, No lesions  NECK: Supple, No JVD, Normal thyroid  NERVOUS SYSTEM:  Alert & Oriented X3, Good concentration, mild tremor  CHEST/LUNG: CTA bilaterally; No rales, rhonchi, wheezing, or rubs  HEART: Regular rate and rhythm; No murmurs, rubs, or gallops  ABDOMEN: Soft, Nontender, Nondistended; Bowel sounds present  EXTREMITIES:  2+ Peripheral Pulses, No clubbing, cyanosis, or edema      LABS:                        13.5   6.33  )-----------( 132      ( 15 Jul 2024 07:34 )             40.1     07-15    142  |  105  |  12  ----------------------------<  108<H>  3.8   |  24  |  0.7    Ca    9.4      15 Jul 2024 07:34  Phos  4.2     07-15  Mg     1.9     07-15    TPro  7.0  /  Alb  4.0  /  TBili  0.7  /  DBili  0.2  /  AST  56<H>  /  ALT  49<H>  /  AlkPhos  90  07-15      Urinalysis Basic - ( 15 Jul 2024 07:34 )    Color: x / Appearance: x / SG: x / pH: x  Gluc: 108 mg/dL / Ketone: x  / Bili: x / Urobili: x   Blood: x / Protein: x / Nitrite: x   Leuk Esterase: x / RBC: x / WBC x   Sq Epi: x / Non Sq Epi: x / Bacteria: x      
53-year-old male with past medical history of asthma, PTSD, EtOH abuse, and psychosis p/w etoh withdrawal and elevated LFTs.     Today:  Seen at bedside, complains of feeling anxious.  NAJMA 12 during my encounter as well as with RN.              REVIEW OF SYSTEMS:  Anxious      MEDICATIONS  (STANDING):  chlorhexidine 2% Cloths 1 Application(s) Topical <User Schedule>  enoxaparin Injectable 40 milliGRAM(s) SubCutaneous every 24 hours  folic acid 1 milliGRAM(s) Oral daily  lurasidone 40 milliGRAM(s) Oral <User Schedule>  nicotine - 21 mG/24Hr(s) Patch 1 Patch Transdermal daily  OLANZapine 10 milliGRAM(s) Oral daily  sodium chloride 0.9%. 1000 milliLiter(s) (75 mL/Hr) IV Continuous <Continuous>  thiamine 100 milliGRAM(s) Oral daily    MEDICATIONS  (PRN):  aluminum hydroxide/magnesium hydroxide/simethicone Suspension 30 milliLiter(s) Oral every 4 hours PRN Dyspepsia  hydrOXYzine hydrochloride 25 milliGRAM(s) Oral every 6 hours PRN Anxiety  LORazepam   Injectable 2 milliGRAM(s) IV Push every 2 hours PRN Symptom-triggered: 2 point increase in CIWA -Ar score and a total score of 7 or LESS  melatonin 3 milliGRAM(s) Oral at bedtime PRN Insomnia  ondansetron Injectable 4 milliGRAM(s) IV Push every 8 hours PRN Nausea and/or Vomiting      Allergies  No Known Allergies          Vital Signs Last 24 Hrs  T(C): 36.9 (14 Jul 2024 10:32), Max: 37.1 (13 Jul 2024 21:01)  T(F): 98.4 (14 Jul 2024 10:32), Max: 98.7 (13 Jul 2024 21:01)  HR: 65 (14 Jul 2024 11:39) (57 - 85)  BP: 119/82 (14 Jul 2024 10:32) (107/68 - 131/87)  RR: 18 (14 Jul 2024 05:35) (18 - 18)  SpO2: 99% (14 Jul 2024 11:39) (97% - 99%)    Parameters below as of 14 Jul 2024 11:39  Patient On (Oxygen Delivery Method): room air        PHYSICAL EXAM:  GENERAL: NAD, well-groomed, well-developed  HEAD:  Atraumatic, Normocephalic  EYES: EOMI, PERRLA, conjunctiva and sclera clear  ENMT: No tonsillar erythema, exudates, or enlargement; Moist mucous membranes, Good dentition, No lesions  NECK: Supple, No JVD, Normal thyroid  NERVOUS SYSTEM:  Alert & Oriented X3, Good concentration, mild tremor  CHEST/LUNG: CTA bilaterally; No rales, rhonchi, wheezing, or rubs  HEART: Regular rate and rhythm; No murmurs, rubs, or gallops  ABDOMEN: Soft, Nontender, Nondistended; Bowel sounds present  EXTREMITIES:  2+ Peripheral Pulses, No clubbing, cyanosis, or edema      LABS:                        13.2   6.18  )-----------( 102      ( 14 Jul 2024 05:59 )             38.7     07-14    140  |  107  |  10  ----------------------------<  95  3.5   |  22  |  0.7    Ca    8.9      14 Jul 2024 05:59  Phos  3.4     07-14  Mg     1.8     07-14    TPro  6.7  /  Alb  3.8  /  TBili  0.8  /  DBili  0.3  /  AST  50<H>  /  ALT  40  /  AlkPhos  86  07-14      Urinalysis Basic - ( 14 Jul 2024 05:59 )    Color: x / Appearance: x / SG: x / pH: x  Gluc: 95 mg/dL / Ketone: x  / Bili: x / Urobili: x   Blood: x / Protein: x / Nitrite: x   Leuk Esterase: x / RBC: x / WBC x   Sq Epi: x / Non Sq Epi: x / Bacteria: x      
53-year-old male with past medical history of asthma, PTSD, EtOH abuse, and psychosis p/w etoh withdrawal and elevated LFTs.     Today:  Seen at bedside, no new complaints.            REVIEW OF SYSTEMS:  No new complaints      MEDICATIONS  (STANDING):  chlorhexidine 2% Cloths 1 Application(s) Topical <User Schedule>  enoxaparin Injectable 40 milliGRAM(s) SubCutaneous every 24 hours  folic acid 1 milliGRAM(s) Oral daily  lurasidone 40 milliGRAM(s) Oral <User Schedule>  multivitamin 1 Tablet(s) Oral daily  nicotine - 21 mG/24Hr(s) Patch 1 Patch Transdermal daily  OLANZapine 10 milliGRAM(s) Oral daily  thiamine IVPB 500 milliGRAM(s) IV Intermittent daily    MEDICATIONS  (PRN):  aluminum hydroxide/magnesium hydroxide/simethicone Suspension 30 milliLiter(s) Oral every 4 hours PRN Dyspepsia  hydrOXYzine hydrochloride 25 milliGRAM(s) Oral every 6 hours PRN Anxiety  LORazepam   Injectable 2 milliGRAM(s) IV Push every 2 hours PRN Symptom-triggered: 2 point increase in CIWA -Ar score and a total score of 7 or LESS  melatonin 3 milliGRAM(s) Oral at bedtime PRN Insomnia  ondansetron Injectable 4 milliGRAM(s) IV Push every 8 hours PRN Nausea and/or Vomiting      Allergies  No Known Allergies          Vital Signs Last 24 Hrs  T(C): 36.9 (16 Jul 2024 13:40), Max: 36.9 (16 Jul 2024 13:40)  T(F): 98.4 (16 Jul 2024 13:40), Max: 98.4 (16 Jul 2024 13:40)  HR: 88 (16 Jul 2024 13:40) (65 - 88)  BP: 112/76 (16 Jul 2024 13:40) (112/76 - 136/88)  RR: 18 (16 Jul 2024 13:40) (18 - 18)  SpO2: 98% (16 Jul 2024 13:40) (98% - 98%)    Parameters below as of 16 Jul 2024 05:27  Patient On (Oxygen Delivery Method): room air        PHYSICAL EXAM:  GENERAL: NAD, well-groomed, well-developed  HEAD:  Atraumatic, Normocephalic  EYES: EOMI, PERRLA, conjunctiva and sclera clear  ENMT: No tonsillar erythema, exudates, or enlargement; Moist mucous membranes, Good dentition, No lesions  NECK: Supple, No JVD, Normal thyroid  NERVOUS SYSTEM:  Alert & Oriented X3, Good concentration, mild tremor  CHEST/LUNG: CTA bilaterally; No rales, rhonchi, wheezing, or rubs  HEART: Regular rate and rhythm; No murmurs, rubs, or gallops  ABDOMEN: Soft, Nontender, Nondistended; Bowel sounds present  EXTREMITIES:  2+ Peripheral Pulses, No clubbing, cyanosis, or edema      LABS:                        14.1   6.72  )-----------( 153      ( 16 Jul 2024 07:47 )             41.9     07-16    141  |  106  |  11  ----------------------------<  114<H>  3.9   |  22  |  0.6<L>    Ca    9.4      16 Jul 2024 07:47  Phos  4.4     07-16  Mg     1.9     07-16    TPro  7.0  /  Alb  4.2  /  TBili  0.7  /  DBili  x   /  AST  74<H>  /  ALT  59<H>  /  AlkPhos  85  07-16      Urinalysis Basic - ( 16 Jul 2024 07:47 )    Color: x / Appearance: x / SG: x / pH: x  Gluc: 114 mg/dL / Ketone: x  / Bili: x / Urobili: x   Blood: x / Protein: x / Nitrite: x   Leuk Esterase: x / RBC: x / WBC x   Sq Epi: x / Non Sq Epi: x / Bacteria: x        
53-year-old male with past medical history of asthma, PTSD, EtOH abuse, and psychosis p/w etoh withdrawal and elevated LFTs.     Today:  Seen at bedside, no new complaints.  States he feels anxious.  Discussed plan with wife at bedside.          REVIEW OF SYSTEMS:  No new complaints      MEDICATIONS  (STANDING):  chlorhexidine 2% Cloths 1 Application(s) Topical <User Schedule>  enoxaparin Injectable 40 milliGRAM(s) SubCutaneous every 24 hours  folic acid 1 milliGRAM(s) Oral daily  lurasidone 40 milliGRAM(s) Oral <User Schedule>  nicotine - 21 mG/24Hr(s) Patch 1 Patch Transdermal daily  OLANZapine 10 milliGRAM(s) Oral daily  sodium chloride 0.9%. 1000 milliLiter(s) (75 mL/Hr) IV Continuous <Continuous>  thiamine 100 milliGRAM(s) Oral daily    MEDICATIONS  (PRN):  aluminum hydroxide/magnesium hydroxide/simethicone Suspension 30 milliLiter(s) Oral every 4 hours PRN Dyspepsia  hydrOXYzine hydrochloride 25 milliGRAM(s) Oral every 6 hours PRN Anxiety  LORazepam   Injectable 2 milliGRAM(s) IV Push every 2 hours PRN Symptom-triggered: 2 point increase in CIWA -Ar score and a total score of 7 or LESS  melatonin 3 milliGRAM(s) Oral at bedtime PRN Insomnia  ondansetron Injectable 4 milliGRAM(s) IV Push every 8 hours PRN Nausea and/or Vomiting      Allergies  No Known Allergies          Vital Signs Last 24 Hrs  T(C): 36.6 (13 Jul 2024 13:10), Max: 36.7 (12 Jul 2024 20:12)  T(F): 97.8 (13 Jul 2024 13:10), Max: 98 (12 Jul 2024 20:12)  HR: 83 (13 Jul 2024 13:10) (64 - 83)  BP: 107/68 (13 Jul 2024 13:10) (107/68 - 128/74)  RR: 16 (13 Jul 2024 04:38) (16 - 16)  SpO2: 97% (13 Jul 2024 13:10) (97% - 98%)    Parameters below as of 13 Jul 2024 13:10  Patient On (Oxygen Delivery Method): room air        PHYSICAL EXAM:  Constitutional: NAD, well-nourished, non toxic appearing   HEENT: Airway patent, moist MM, no erythema/swelling/deformity of oral structures. EOMI, PERRLA.  CV: regular rate, regular rhythm, well-perfused extremities, 2+ b/l DP and radial pulses equal.  Lungs: BCTA, no increased WOB.  ABD: NTND, no guarding or rebound, no pulsatile mass, no hernias.   NEURO: A&Ox3, normal strength, nl sensation throughout, normal speech.   PSYCH: Denies SI/HI/hallucinations    LABS:                        13.4   5.85  )-----------( 86       ( 13 Jul 2024 05:48 )             40.4     07-13    143  |  104  |  10  ----------------------------<  100<H>  3.3<L>   |  23  |  0.6<L>    Ca    8.9      13 Jul 2024 05:48  Phos  3.9     07-13  Mg     1.8     07-13    TPro  6.7  /  Alb  3.9  /  TBili  1.0  /  DBili  0.3  /  AST  61<H>  /  ALT  49<H>  /  AlkPhos  93  07-13      Urinalysis Basic - ( 13 Jul 2024 05:48 )    Color: x / Appearance: x / SG: x / pH: x  Gluc: 100 mg/dL / Ketone: x  / Bili: x / Urobili: x   Blood: x / Protein: x / Nitrite: x   Leuk Esterase: x / RBC: x / WBC x   Sq Epi: x / Non Sq Epi: x / Bacteria: x        
SUBJECTIVE:    Patient is a 53y old Male who presents with a chief complaint of etoh withdrawal (16 Jul 2024 15:36)    Currently admitted to medicine with the primary diagnosis of Alcohol dependence with withdrawal       Today is hospital day 6d. This morning he is resting comfortably in bed and reports no new issues or overnight events.     ROS:   CONSTITUTIONAL: No weakness, fevers or chills   EYES/ENT: No visual changes; No vertigo or throat pain   NECK: No pain or stiffness   RESPIRATORY: No cough, wheezing, hemoptysis; No shortness of breath   CARDIOVASCULAR: No chest pain or palpitations   GASTROINTESTINAL: No abdominal or epigastric pain. No nausea, vomiting, or hematemesis; No diarrhea or constipation. No melena or hematochezia.  GENITOURINARY: No dysuria, frequency or hematuria  NEUROLOGICAL: No numbness or weakness  SKIN: No itching, rashes      PAST MEDICAL & SURGICAL HISTORY  PTSD (post-traumatic stress disorder)    Asthma, unspecified asthma severity, unspecified whether complicated, unspecified whether persistent    No significant past surgical history      SOCIAL HISTORY:    ALLERGIES:  No Known Allergies    MEDICATIONS:  STANDING MEDICATIONS  chlorhexidine 2% Cloths 1 Application(s) Topical <User Schedule>  enoxaparin Injectable 40 milliGRAM(s) SubCutaneous every 24 hours  folic acid 1 milliGRAM(s) Oral daily  lurasidone 40 milliGRAM(s) Oral <User Schedule>  multivitamin 1 Tablet(s) Oral daily  nicotine - 21 mG/24Hr(s) Patch 1 Patch Transdermal daily  OLANZapine 10 milliGRAM(s) Oral daily  thiamine IVPB 500 milliGRAM(s) IV Intermittent daily    PRN MEDICATIONS  aluminum hydroxide/magnesium hydroxide/simethicone Suspension 30 milliLiter(s) Oral every 4 hours PRN  hydrOXYzine hydrochloride 25 milliGRAM(s) Oral every 6 hours PRN  LORazepam   Injectable 2 milliGRAM(s) IV Push every 2 hours PRN  melatonin 3 milliGRAM(s) Oral at bedtime PRN  ondansetron Injectable 4 milliGRAM(s) IV Push every 8 hours PRN    VITALS:   T(F): 98.3  HR: 75  BP: 126/84  RR: 18  SpO2: 98%    LABS:  Negative for smoking/alcohol/drug use.                         14.2   7.52  )-----------( 186      ( 17 Jul 2024 07:51 )             42.4     07-17    140  |  107  |  13  ----------------------------<  98  4.2   |  20  |  0.6<L>    Ca    9.2      17 Jul 2024 07:51  Phos  4.3     07-17  Mg     2.1     07-17    TPro  7.2  /  Alb  4.2  /  TBili  0.6  /  DBili  x   /  AST  85<H>  /  ALT  69<H>  /  AlkPhos  87  07-17      Urinalysis Basic - ( 17 Jul 2024 07:51 )    Color: x / Appearance: x / SG: x / pH: x  Gluc: 98 mg/dL / Ketone: x  / Bili: x / Urobili: x   Blood: x / Protein: x / Nitrite: x   Leuk Esterase: x / RBC: x / WBC x   Sq Epi: x / Non Sq Epi: x / Bacteria: x      RADIOLOGY:    PHYSICAL EXAM:  GEN: No acute distress  HEENT: normocephalic, atraumatic, aniceteric  LUNGS: bl breath sounds   HEART: S1/S2 present. RRR, no murmurs  ABD: Soft, non-tender, non-distended. Bowel sounds present  EXT: warm   NEURO: AAOX3, normal affect      ASSESSMENT AND PLAN:    53-year-old male with past medical history of asthma, PTSD, EtOH abuse, and psychosis p/w etoh withdrawal and elevated LFTs.     # Etoh withdrawal  # Elevated LFT's   # H/O ETOH   # Difficulty ambulating / Memory issues   - CIWA symptom-triggered Ativan protocol given LFTs  - thiamine/ folate/MV  - trend LFTs-normalized.  ggt elevated, hepatitis panel neg  - CT:  Hepatic steatosis; no biliary ductal dilation. Few nonspecific prominent upper abdominal lymph nodes.  - as per wife, patient has been confused and unsteady on his feet for "quite some time".  - Slightly increased symmetric FLAIR signals in bilateral tuber cinereum is   likely artifactual. Otherwise no MR evidence for Wernicke's   encephalopathy. No acute intracranial pathology. Small regions of hemosiderin staining along the right frontal and   parietal sulci likely reflecting sequela of prior subarachnoid   hemorrhage. Punctate remote microhemorrhages in bilateral frontal lobes.  - Neurology FU   - Per neuro - Can try IV thiamine x 5 days and monitor for improvement. Low suspicion for WE per neurology.     # Magnesium deficiency - replace and repeat Mag PRN    # Tobacco use - smoking cessation, nicotine replacement     # H/O PTSD, psychosis  - c/w zyprexa, latuda     dvt/ gi ppx/diet  dispo: acute  handoff: pt alyssa   
53-year-old male with past medical history of asthma, PTSD, EtOH abuse, and psychosis p/w etoh withdrawal and elevated LFTs.     Today:  Seen at bedside, no new complaints.        REVIEW OF SYSTEMS:  No new complaints      MEDICATIONS  (STANDING):  chlorhexidine 2% Cloths 1 Application(s) Topical <User Schedule>  enoxaparin Injectable 40 milliGRAM(s) SubCutaneous every 24 hours  folic acid 1 milliGRAM(s) Oral daily  lurasidone 40 milliGRAM(s) Oral <User Schedule>  nicotine - 21 mG/24Hr(s) Patch 1 Patch Transdermal daily  OLANZapine 10 milliGRAM(s) Oral daily  sodium chloride 0.9%. 1000 milliLiter(s) (75 mL/Hr) IV Continuous <Continuous>  thiamine 100 milliGRAM(s) Oral daily    MEDICATIONS  (PRN):  aluminum hydroxide/magnesium hydroxide/simethicone Suspension 30 milliLiter(s) Oral every 4 hours PRN Dyspepsia  hydrOXYzine hydrochloride 25 milliGRAM(s) Oral every 6 hours PRN Anxiety  LORazepam   Injectable 2 milliGRAM(s) IV Push every 2 hours PRN Symptom-triggered: 2 point increase in CIWA -Ar score and a total score of 7 or LESS  melatonin 3 milliGRAM(s) Oral at bedtime PRN Insomnia  ondansetron Injectable 4 milliGRAM(s) IV Push every 8 hours PRN Nausea and/or Vomiting      Allergies  No Known Allergies          Vital Signs Last 24 Hrs  T(C): 36.6 (12 Jul 2024 14:30), Max: 36.8 (11 Jul 2024 20:41)  T(F): 97.8 (12 Jul 2024 14:30), Max: 98.2 (11 Jul 2024 20:41)  HR: 86 (12 Jul 2024 14:30) (72 - 86)  BP: 146/90 (12 Jul 2024 14:30) (124/69 - 146/90)  RR: 18 (12 Jul 2024 14:30) (18 - 18)  SpO2: 96% (12 Jul 2024 14:30) (95% - 100%)    Parameters below as of 12 Jul 2024 11:30  Patient On (Oxygen Delivery Method): room air        PHYSICAL EXAM:  Constitutional: NAD, well-nourished, non toxic appearing   HEENT: Airway patent, moist MM, no erythema/swelling/deformity of oral structures. EOMI, PERRLA.  CV: regular rate, regular rhythm, well-perfused extremities, 2+ b/l DP and radial pulses equal.  Lungs: BCTA, no increased WOB.  ABD: NTND, no guarding or rebound, no pulsatile mass, no hernias.   NEURO: A&Ox3, normal strength, nl sensation throughout, normal speech.   PSYCH: Denies SI/HI/hallucinations      LABS:                        14.0   5.68  )-----------( 75       ( 12 Jul 2024 08:02 )             41.7     07-12    144  |  106  |  7<L>  ----------------------------<  87  3.8   |  26  |  0.6<L>    Ca    9.5      12 Jul 2024 08:02  Mg     2.1     07-12    TPro  7.5  /  Alb  4.2  /  TBili  1.4<H>  /  DBili  x   /  AST  88<H>  /  ALT  58<H>  /  AlkPhos  100  07-12    PT/INR - ( 11 Jul 2024 14:34 )   PT: 12.90 sec;   INR: 1.13 ratio         PTT - ( 11 Jul 2024 14:34 )  PTT:31.5 sec  Urinalysis Basic - ( 12 Jul 2024 08:02 )    Color: x / Appearance: x / SG: x / pH: x  Gluc: 87 mg/dL / Ketone: x  / Bili: x / Urobili: x   Blood: x / Protein: x / Nitrite: x   Leuk Esterase: x / RBC: x / WBC x   Sq Epi: x / Non Sq Epi: x / Bacteria: x    
    Pt interviewed, examined and EMR chart reviewed.    Follow up of Alcohol Dependency. Pt is on completed protocol. Pt is doing much better. Pt with no complaint of withdrawal and ready for rehab      REVIEW OF SYSTEMS:    Constitutional: No fever, weight loss or fatigue  ENT:  No difficulty hearing, tinnitus, vertigo; No sinus or throat pain  Neck: No pain or stiffness  Respiratory: No cough, wheezing, chills or hemoptysis  Cardiovascular: No chest pain, palpitations, shortness of breath, dizziness or leg swelling  Gastrointestinal: No abdominal or epigastric pain. No nausea, vomiting or hematemesis; No diarrhea or constipation. No melena or hematochezia.  Neurological: No headaches, memory loss, loss of strength, numbness or tremors  Musculoskeletal: No joint pain or swelling; No muscle, back or extremity pain      MEDICATIONS  (STANDING):  chlorhexidine 2% Cloths 1 Application(s) Topical <User Schedule>  enoxaparin Injectable 40 milliGRAM(s) SubCutaneous every 24 hours  folic acid 1 milliGRAM(s) Oral daily  lurasidone 40 milliGRAM(s) Oral <User Schedule>  multivitamin 1 Tablet(s) Oral daily  nicotine - 21 mG/24Hr(s) Patch 1 Patch Transdermal daily  OLANZapine 10 milliGRAM(s) Oral daily  sodium chloride 0.9%. 1000 milliLiter(s) (75 mL/Hr) IV Continuous <Continuous>  thiamine 100 milliGRAM(s) Oral daily    MEDICATIONS  (PRN):  aluminum hydroxide/magnesium hydroxide/simethicone Suspension 30 milliLiter(s) Oral every 4 hours PRN Dyspepsia  hydrOXYzine hydrochloride 25 milliGRAM(s) Oral every 6 hours PRN Anxiety  LORazepam   Injectable 2 milliGRAM(s) IV Push every 2 hours PRN Symptom-triggered: 2 point increase in CIWA -Ar score and a total score of 7 or LESS  melatonin 3 milliGRAM(s) Oral at bedtime PRN Insomnia  ondansetron Injectable 4 milliGRAM(s) IV Push every 8 hours PRN Nausea and/or Vomiting      Vital Signs Last 24 Hrs  T(C): 36.6 (16 Jul 2024 05:27), Max: 36.6 (15 Jul 2024 13:28)  T(F): 97.8 (16 Jul 2024 05:27), Max: 97.9 (15 Jul 2024 13:28)  HR: 65 (16 Jul 2024 05:27) (64 - 65)  BP: 136/88 (16 Jul 2024 05:27) (122/79 - 136/88)  BP(mean): --  RR: 18 (16 Jul 2024 05:27) (18 - 18)  SpO2: 98% (16 Jul 2024 05:27) (98% - 98%)    Parameters below as of 16 Jul 2024 05:27  Patient On (Oxygen Delivery Method): room air        PHYSICAL EXAM:    Constitutional: NAD, well-groomed, well-developed  HEENT: PERRLA, EOMI, Normal Hearing,   Neck: No LAD, No JVD  Back: Normal spine flexure, No CVA tenderness  Respiratory: CTAB/L  Cardiovascular: S1 and S2, RRR, no M/G/R  Gastrointestinal: BS+, soft, NT/ND  Extremities: No peripheral edema  Neurological: A/O x 3, no focal deficits    LABS:                        14.1   6.72  )-----------( 153      ( 16 Jul 2024 07:47 )             41.9     07-15    142  |  105  |  12  ----------------------------<  108<H>  3.8   |  24  |  0.7    Ca    9.4      15 Jul 2024 07:34  Phos  4.2     07-15  Mg     1.9     07-15    TPro  7.0  /  Alb  4.0  /  TBili  0.7  /  DBili  0.2  /  AST  56<H>  /  ALT  49<H>  /  AlkPhos  90  07-15      Urinalysis Basic - ( 15 Jul 2024 07:34 )    Color: x / Appearance: x / SG: x / pH: x  Gluc: 108 mg/dL / Ketone: x  / Bili: x / Urobili: x   Blood: x / Protein: x / Nitrite: x   Leuk Esterase: x / RBC: x / WBC x   Sq Epi: x / Non Sq Epi: x / Bacteria: x      Drug Screen Urine:  Alcohol Level        RADIOLOGY & ADDITIONAL STUDIES:

## 2024-07-17 NOTE — CHART NOTE - NSCHARTNOTEFT_GEN_A_CORE
MRI brain resulted and reviewed.  Prior residual for hemorrhage likely from traumatic causes.  No findings to suggest Wernicke's  No further neurological workup needed  No contraindication to DVT prophylaxis          PROCEDURE DATE:  07/16/2024          INTERPRETATION:  CLINICAL INDICATION: Alcohol withdrawal, work up for   Wernicke's    TECHNIQUE: Multi-planar multi-sequential MR imaging of thebrain was   performed without intravenous contrast.    COMPARISON: CT head 7/11/2024    FINDINGS:    There are mild hemosiderin staining along the right frontal and parietal   sulci likely reflecting sequela of prior subarachnoid hemorrhage. There   are punctate subtle signal signal loss in the left frontal lobe and right   frontal lobe likely reflecting prior microhemorrhages.    Slightly increased symmetric FLAIR signals in bilateral tuber cinereum is   likely artifactual.    No acute infarction, intracranial hemorrhage or mass.    There is no evidence of hydrocephalus. There are no extra-axial fluid   collections. The skull base flow voids are present.    The visualized intraorbital contents are normal. Small mucosal polyp in   the left maxillary sinus. Small polyp in the posterior left nasal cavity.   The mastoid air cells are clear. The visualized soft tissues and osseous   structures appear normal.    IMPRESSION:    Slightly increased symmetric FLAIR signals in bilateral tuber cinereum is   likely artifactual. Otherwise no MR evidence for Wernicke's   encephalopathy. No acute intracranial pathology.    Small regions of hemosiderin staining along the right frontal and   parietal sulci likely reflecting sequela of prior subarachnoid   hemorrhage. Punctate remote microhemorrhages in bilateral frontal lobes.    --- End of Report ---    < end of copied text >

## 2024-07-17 NOTE — PHYSICAL THERAPY INITIAL EVALUATION ADULT - ADDITIONAL COMMENTS
pt lives with his wife in a private house with 4-5 steps to enter without rails or 3 steps with rails through the garage, 10 steps with rails to bedroom and bathroom area.  Pt was independent community ambulator prior to admission without AD, pt is an active  prior to admission

## 2024-07-17 NOTE — PROGRESS NOTE ADULT - REASON FOR ADMISSION
etoh withdrawal

## 2024-07-18 ENCOUNTER — TRANSCRIPTION ENCOUNTER (OUTPATIENT)
Age: 54
End: 2024-07-18

## 2024-07-18 VITALS
TEMPERATURE: 98 F | HEART RATE: 74 BPM | SYSTOLIC BLOOD PRESSURE: 121 MMHG | RESPIRATION RATE: 17 BRPM | DIASTOLIC BLOOD PRESSURE: 87 MMHG | OXYGEN SATURATION: 99 %

## 2024-07-18 DIAGNOSIS — G31.9 DEGENERATIVE DISEASE OF NERVOUS SYSTEM, UNSPECIFIED: ICD-10-CM

## 2024-07-18 PROCEDURE — 99239 HOSP IP/OBS DSCHRG MGMT >30: CPT

## 2024-07-18 RX ORDER — HYDROXYZINE PAMOATE 50 MG/1
1 CAPSULE ORAL
Qty: 56 | Refills: 0
Start: 2024-07-18 | End: 2024-07-31

## 2024-07-18 RX ADMIN — NICOTINE POLACRILEX 1 PATCH: 2 LOZENGE ORAL at 12:38

## 2024-07-18 RX ADMIN — Medication 1 MILLIGRAM(S): at 12:37

## 2024-07-18 RX ADMIN — OLANZAPINE 10 MILLIGRAM(S): 2.5 TABLET, FILM COATED ORAL at 12:37

## 2024-07-18 RX ADMIN — Medication 1 APPLICATION(S): at 05:18

## 2024-07-18 RX ADMIN — Medication 1 TABLET(S): at 12:37

## 2024-07-18 NOTE — DISCHARGE NOTE PROVIDER - NSDCCPCAREPLAN_GEN_ALL_CORE_FT
PRINCIPAL DISCHARGE DIAGNOSIS  Diagnosis: Alcohol dependence with withdrawal  Assessment and Plan of Treatment: you were on as needed [facility] protocol  abstain from drinking   continue to follow with addiction team      SECONDARY DISCHARGE DIAGNOSES  Diagnosis: Elevated LFTs  Assessment and Plan of Treatment: LFTs-normalized.  ggt elevated, hepatitis panel neg  - CT abdomen:  Hepatic steatosis; no biliary ductal dilation. Few nonspecific prominent upper abdominal lymph nodes.  Gastroenterology evaluated - Follow up with  GI Hepatology [facility] located at [address] [place] [address], [address]. Telephone No: [phone]

## 2024-07-18 NOTE — DISCHARGE NOTE PROVIDER - PROVIDER TOKENS
PROVIDER:[TOKEN:[[id number]]] PROVIDER:[TOKEN:[[id number]]],PROVIDER:[TOKEN:[[id number]]],PROVIDER:[TOKEN:[[id number]]],FREE:[LAST:[GI Hepatology Clinic],PHONE:[(   )    -],FAX:[(   )    -]]

## 2024-07-18 NOTE — DISCHARGE NOTE PROVIDER - CARE PROVIDERS DIRECT ADDRESSES
,[email or web address],DirectAddress_Unknown,DirectAddress_Unknown,DirectAddress_Unknown

## 2024-07-18 NOTE — DISCHARGE NOTE PROVIDER - ATTENDING DISCHARGE PHYSICAL EXAMINATION:
PHYSICAL EXAM:  GENERAL: NAD, lying in bed comfortably  HEAD:  Atraumatic, Normocephalic  EYES: EOMI, PERRLA, conjunctiva and sclera clear  ENT: Moist mucous membranes  NECK: Supple, No JVD  CHEST/LUNG: Clear to auscultation bilaterally; No rales, rhonchi, wheezing, or rubs. Unlabored respirations  HEART: Regular rate and rhythm; No murmurs, rubs, or gallops  ABDOMEN: Bowel sounds present; Soft, Nontender, Nondistended. No hepatomegally  EXTREMITIES:  2+ Peripheral Pulses, brisk capillary refill. No clubbing, cyanosis, or edema  NERVOUS SYSTEM:  Alert & Oriented X3, speech clear. No deficits   MSK: FROM all 4 extremities, full and equal strength  SKIN: No rashes or lesions <-- Click to add NO significant Past Surgical History

## 2024-07-18 NOTE — DISCHARGE NOTE PROVIDER - CARE PROVIDER_API CALL
Chuck Ojeda  Neurology  [address] [place] [address], [address]  [place] [address]  Phone: [phone]  Fax: [phone]  Follow Up Time:    Chuck Ojeda  Neurology  [address] [facility], [address]  [place] [address]  Phone: [phone]  Fax: [phone]  Follow Up Time:     Jared Navarro  Physician Assistant Services  [address] [place] [address]  Phone: [phone]  Fax: [phone]  Follow Up Time:     Martin Roblero  Internal Medicine  [address] [place] [address] [place] [address]  Phone: [phone]  Fax: [phone]  Follow Up Time:     GI Hepatology Clinic,   Phone: (   )    -  Fax: (   )    -  Follow Up Time:

## 2024-07-18 NOTE — DISCHARGE NOTE PROVIDER - HOSPITAL COURSE
53-year-old male with past medical history of asthma, PTSD, EtOH abuse, and psychosis p/w etoh withdrawal and elevated LFTs.     # Etoh withdrawal  # Elevated LFT's   # H/O ETOH   # Difficulty ambulating / Memory issues   - CIWA symptom-triggered Ativan protocol given LFTs  - thiamine/ folate/MV  - trend LFTs-normalized.  ggt elevated, hepatitis panel neg  - CT:  Hepatic steatosis; no biliary ductal dilation. Few nonspecific prominent upper abdominal lymph nodes.  - as per wife, patient has been confused and unsteady on his feet for "quite some time".  - Slightly increased symmetric FLAIR signals in bilateral tuber cinereum is   likely artifactual. Otherwise no MR evidence for Wernicke's   encephalopathy. No acute intracranial pathology. Small regions of hemosiderin staining along the right frontal and   parietal sulci likely reflecting sequela of prior subarachnoid   hemorrhage. Punctate remote microhemorrhages in bilateral frontal lobes.  - Neurology FU appreciated   - Per neuro - Can try IV thiamine x 5 days and monitor for improvement. Low suspicion for WE per neurology.     # Magnesium deficiency - replace and repeat Mag PRN    # Tobacco use - smoking cessation, nicotine replacement     # H/O PTSD, psychosis  - c/w zyprexa, latblaire     attending attestation:  patient seen and examined on day of discharge  labs and vitals reviewed  patient has no complaints  plan of care discussed with patient/family in agreement and understanding

## 2024-07-18 NOTE — DISCHARGE NOTE PROVIDER - NSDCMRMEDTOKEN_GEN_ALL_CORE_FT
folic acid 1 mg oral tablet: 1 tab(s) orally once a day x 14 days   hydrOXYzine hydrochloride 50 mg oral tablet: 1 tab(s) orally every 8 hours x 14 days, As Needed -anxiety/insomnia   lurasidone 40 mg tablet: TAKE ONE TABLET BY MOUTH DAILY WITH DINNER  Multiple Vitamins oral tablet: 1 tab(s) orally once a day. Continue to take until told otherwise by your provider.   nicotine 21 mg/24 hr transdermal film, extended release: 1 patch transdermal once a day for nicotine craving. Continue to take until told otherwise by your provider.   olanzapine 10 mg tablet: TAKE ONE TABLET BY MOUTH DAILY  thiamine 100 mg oral tablet: 1 tab(s) orally once a day x 14 days

## 2024-07-18 NOTE — DISCHARGE NOTE NURSING/CASE MANAGEMENT/SOCIAL WORK - PATIENT PORTAL LINK FT
You can access the FollowMyHealth Patient Portal offered by [facility] by registering at the following website: [email or web address]. By joining [facility]’s FollowMyHealth portal, you will also be able to view your health information using other applications (apps) compatible with our system.

## 2024-07-19 ENCOUNTER — EMERGENCY (EMERGENCY)
Facility: HOSPITAL | Age: 54
LOS: 0 days | Discharge: HOME | End: 2024-07-19
Attending: STUDENT IN AN ORGANIZED HEALTH CARE EDUCATION/TRAINING PROGRAM
Payer: MEDICARE

## 2024-07-19 VITALS
HEIGHT: 72 IN | OXYGEN SATURATION: 98 % | RESPIRATION RATE: 18 BRPM | TEMPERATURE: 97 F | SYSTOLIC BLOOD PRESSURE: 110 MMHG | DIASTOLIC BLOOD PRESSURE: 72 MMHG | HEART RATE: 80 BPM

## 2024-07-19 VITALS
SYSTOLIC BLOOD PRESSURE: 116 MMHG | OXYGEN SATURATION: 98 % | DIASTOLIC BLOOD PRESSURE: 68 MMHG | RESPIRATION RATE: 18 BRPM | HEART RATE: 72 BPM

## 2024-07-19 DIAGNOSIS — I95.9 HYPOTENSION, UNSPECIFIED: ICD-10-CM

## 2024-07-19 DIAGNOSIS — J45.909 UNSPECIFIED ASTHMA, UNCOMPLICATED: ICD-10-CM

## 2024-07-19 LAB
ALBUMIN SERPL ELPH-MCNC: 4.4 G/DL — SIGNIFICANT CHANGE UP (ref 3.5–5.2)
ALP SERPL-CCNC: 89 U/L — SIGNIFICANT CHANGE UP (ref 30–115)
ALT FLD-CCNC: 104 U/L — HIGH (ref 0–41)
ANION GAP SERPL CALC-SCNC: 11 MMOL/L — SIGNIFICANT CHANGE UP (ref 7–14)
AST SERPL-CCNC: 126 U/L — HIGH (ref 0–41)
BASOPHILS # BLD AUTO: 0.03 K/UL — SIGNIFICANT CHANGE UP (ref 0–0.2)
BASOPHILS NFR BLD AUTO: 0.3 % — SIGNIFICANT CHANGE UP (ref 0–1)
BILIRUB SERPL-MCNC: 0.4 MG/DL — SIGNIFICANT CHANGE UP (ref 0.2–1.2)
BUN SERPL-MCNC: 16 MG/DL — SIGNIFICANT CHANGE UP (ref 10–20)
CALCIUM SERPL-MCNC: 10 MG/DL — SIGNIFICANT CHANGE UP (ref 8.4–10.5)
CHLORIDE SERPL-SCNC: 103 MMOL/L — SIGNIFICANT CHANGE UP (ref 98–110)
CO2 SERPL-SCNC: 26 MMOL/L — SIGNIFICANT CHANGE UP (ref 17–32)
CREAT SERPL-MCNC: 0.8 MG/DL — SIGNIFICANT CHANGE UP (ref 0.7–1.5)
EGFR: 106 ML/MIN/1.73M2 — SIGNIFICANT CHANGE UP
EOSINOPHIL # BLD AUTO: 0 K/UL — SIGNIFICANT CHANGE UP (ref 0–0.7)
EOSINOPHIL NFR BLD AUTO: 0 % — SIGNIFICANT CHANGE UP (ref 0–8)
ETHANOL SERPL-MCNC: <10 MG/DL — SIGNIFICANT CHANGE UP
GLUCOSE SERPL-MCNC: 173 MG/DL — HIGH (ref 70–99)
HCT VFR BLD CALC: 44.2 % — SIGNIFICANT CHANGE UP (ref 42–52)
HGB BLD-MCNC: 14.6 G/DL — SIGNIFICANT CHANGE UP (ref 14–18)
IMM GRANULOCYTES NFR BLD AUTO: 0.7 % — HIGH (ref 0.1–0.3)
LYMPHOCYTES # BLD AUTO: 1.16 K/UL — LOW (ref 1.2–3.4)
LYMPHOCYTES # BLD AUTO: 13.2 % — LOW (ref 20.5–51.1)
MCHC RBC-ENTMCNC: 33 G/DL — SIGNIFICANT CHANGE UP (ref 32–37)
MCHC RBC-ENTMCNC: 33.5 PG — HIGH (ref 27–31)
MCV RBC AUTO: 101.4 FL — HIGH (ref 80–94)
MONOCYTES # BLD AUTO: 1.25 K/UL — HIGH (ref 0.1–0.6)
MONOCYTES NFR BLD AUTO: 14.2 % — HIGH (ref 1.7–9.3)
NEUTROPHILS # BLD AUTO: 6.28 K/UL — SIGNIFICANT CHANGE UP (ref 1.4–6.5)
NEUTROPHILS NFR BLD AUTO: 71.6 % — SIGNIFICANT CHANGE UP (ref 42.2–75.2)
NRBC # BLD: 0 /100 WBCS — SIGNIFICANT CHANGE UP (ref 0–0)
PLATELET # BLD AUTO: 238 K/UL — SIGNIFICANT CHANGE UP (ref 130–400)
PMV BLD: 10.6 FL — HIGH (ref 7.4–10.4)
POTASSIUM SERPL-MCNC: 4.6 MMOL/L — SIGNIFICANT CHANGE UP (ref 3.5–5)
POTASSIUM SERPL-SCNC: 4.6 MMOL/L — SIGNIFICANT CHANGE UP (ref 3.5–5)
PROT SERPL-MCNC: 7.9 G/DL — SIGNIFICANT CHANGE UP (ref 6–8)
RBC # BLD: 4.36 M/UL — LOW (ref 4.7–6.1)
RBC # FLD: 14.6 % — HIGH (ref 11.5–14.5)
SODIUM SERPL-SCNC: 140 MMOL/L — SIGNIFICANT CHANGE UP (ref 135–146)
WBC # BLD: 8.78 K/UL — SIGNIFICANT CHANGE UP (ref 4.8–10.8)
WBC # FLD AUTO: 8.78 K/UL — SIGNIFICANT CHANGE UP (ref 4.8–10.8)

## 2024-07-19 PROCEDURE — 80053 COMPREHEN METABOLIC PANEL: CPT

## 2024-07-19 PROCEDURE — 80307 DRUG TEST PRSMV CHEM ANLYZR: CPT

## 2024-07-19 PROCEDURE — 36415 COLL VENOUS BLD VENIPUNCTURE: CPT

## 2024-07-19 PROCEDURE — 85025 COMPLETE CBC W/AUTO DIFF WBC: CPT

## 2024-07-19 PROCEDURE — 99283 EMERGENCY DEPT VISIT LOW MDM: CPT | Mod: 25

## 2024-07-19 PROCEDURE — 99284 EMERGENCY DEPT VISIT MOD MDM: CPT | Mod: FS

## 2024-07-19 PROCEDURE — 36000 PLACE NEEDLE IN VEIN: CPT

## 2024-07-19 RX ORDER — SODIUM CHLORIDE 0.9 % (FLUSH) 0.9 %
1000 SYRINGE (ML) INJECTION ONCE
Refills: 0 | Status: COMPLETED | OUTPATIENT
Start: 2024-07-19 | End: 2024-07-19

## 2024-07-19 RX ADMIN — Medication 1000 MILLILITER(S): at 12:13

## 2024-07-21 LAB — VIT B1 SERPL-MCNC: 152.1 NMOL/L — SIGNIFICANT CHANGE UP (ref 66.5–200)

## 2024-08-02 DIAGNOSIS — R74.01 ELEVATION OF LEVELS OF LIVER TRANSAMINASE LEVELS: ICD-10-CM

## 2024-08-02 DIAGNOSIS — E61.2 MAGNESIUM DEFICIENCY: ICD-10-CM

## 2024-08-02 DIAGNOSIS — R97.8 OTHER ABNORMAL TUMOR MARKERS: ICD-10-CM

## 2024-08-02 DIAGNOSIS — G31.9 DEGENERATIVE DISEASE OF NERVOUS SYSTEM, UNSPECIFIED: ICD-10-CM

## 2024-08-02 DIAGNOSIS — R94.5 ABNORMAL RESULTS OF LIVER FUNCTION STUDIES: ICD-10-CM

## 2024-08-02 DIAGNOSIS — K70.10 ALCOHOLIC HEPATITIS WITHOUT ASCITES: ICD-10-CM

## 2024-08-02 DIAGNOSIS — K76.0 FATTY (CHANGE OF) LIVER, NOT ELSEWHERE CLASSIFIED: ICD-10-CM

## 2024-08-02 DIAGNOSIS — F17.200 NICOTINE DEPENDENCE, UNSPECIFIED, UNCOMPLICATED: ICD-10-CM

## 2024-08-02 DIAGNOSIS — F41.9 ANXIETY DISORDER, UNSPECIFIED: ICD-10-CM

## 2024-08-02 DIAGNOSIS — F10.239 ALCOHOL DEPENDENCE WITH WITHDRAWAL, UNSPECIFIED: ICD-10-CM

## 2024-08-02 DIAGNOSIS — R29.6 REPEATED FALLS: ICD-10-CM

## 2024-08-02 DIAGNOSIS — F43.10 POST-TRAUMATIC STRESS DISORDER, UNSPECIFIED: ICD-10-CM

## 2024-08-02 DIAGNOSIS — F29 UNSPECIFIED PSYCHOSIS NOT DUE TO A SUBSTANCE OR KNOWN PHYSIOLOGICAL CONDITION: ICD-10-CM

## 2024-08-02 DIAGNOSIS — F12.99 CANNABIS USE, UNSPECIFIED WITH UNSPECIFIED CANNABIS-INDUCED DISORDER: ICD-10-CM
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
## 2024-12-17 NOTE — ED PROVIDER NOTE - NSICDXPASTMEDICALHX_GEN_ALL_CORE_FT
PAST MEDICAL HISTORY:  Asthma, unspecified asthma severity, unspecified whether complicated, unspecified whether persistent     PTSD (post-traumatic stress disorder)      no

## 2025-05-14 NOTE — PATIENT PROFILE ADULT - DO YOU EVER NEED HELP READING HOSPITAL MATERIALS?
Left voicemail for patient to call back.     Order for annual mammogram was placed by Floyd Rodarte 24, does not  until 26.    no

## 2025-06-02 ENCOUNTER — APPOINTMENT (OUTPATIENT)
Dept: NEUROLOGY | Facility: CLINIC | Age: 55
End: 2025-06-02

## 2025-06-18 ENCOUNTER — APPOINTMENT (OUTPATIENT)
Dept: NEUROLOGY | Facility: CLINIC | Age: 55
End: 2025-06-18
Payer: MEDICARE

## 2025-06-18 ENCOUNTER — APPOINTMENT (OUTPATIENT)
Dept: NEUROLOGY | Facility: CLINIC | Age: 55
End: 2025-06-18

## 2025-06-18 ENCOUNTER — NON-APPOINTMENT (OUTPATIENT)
Age: 55
End: 2025-06-18

## 2025-06-18 VITALS
SYSTOLIC BLOOD PRESSURE: 117 MMHG | WEIGHT: 180 LBS | HEART RATE: 87 BPM | BODY MASS INDEX: 24.38 KG/M2 | HEIGHT: 72 IN | DIASTOLIC BLOOD PRESSURE: 81 MMHG

## 2025-06-18 PROBLEM — R41.89 COGNITIVE IMPAIRMENT: Status: ACTIVE | Noted: 2025-06-18

## 2025-06-18 PROBLEM — F10.10 ALCOHOL ABUSE: Status: ACTIVE | Noted: 2025-06-18

## 2025-06-18 PROCEDURE — 99215 OFFICE O/P EST HI 40 MIN: CPT

## 2025-06-18 RX ORDER — GABAPENTIN 300 MG/1
300 CAPSULE ORAL
Refills: 0 | Status: ACTIVE | COMMUNITY

## 2025-06-18 RX ORDER — NALTREXONE HYDROCHLORIDE 50 MG/1
50 TABLET, FILM COATED ORAL
Refills: 0 | Status: ACTIVE | COMMUNITY

## 2025-07-02 ENCOUNTER — OUTPATIENT (OUTPATIENT)
Dept: OUTPATIENT SERVICES | Facility: HOSPITAL | Age: 55
LOS: 1 days | End: 2025-07-02
Payer: MEDICARE

## 2025-07-02 DIAGNOSIS — R41.89 OTHER SYMPTOMS AND SIGNS INVOLVING COGNITIVE FUNCTIONS AND AWARENESS: ICD-10-CM

## 2025-07-02 LAB — GLUCOSE BLDC GLUCOMTR-MCNC: 111 MG/DL — HIGH (ref 70–99)

## 2025-07-02 PROCEDURE — 78608 BRAIN IMAGING (PET): CPT | Mod: MH

## 2025-07-02 PROCEDURE — 78999 UNLISTED MISC PX DX NUC MED: CPT

## 2025-07-02 PROCEDURE — 78608 BRAIN IMAGING (PET): CPT | Mod: 26

## 2025-07-02 PROCEDURE — A9552: CPT

## 2025-07-02 PROCEDURE — 78999 UNLISTED MISC PX DX NUC MED: CPT | Mod: 26

## 2025-07-02 PROCEDURE — 82962 GLUCOSE BLOOD TEST: CPT

## 2025-07-03 DIAGNOSIS — R41.89 OTHER SYMPTOMS AND SIGNS INVOLVING COGNITIVE FUNCTIONS AND AWARENESS: ICD-10-CM

## 2025-07-11 DIAGNOSIS — R94.02 ABNORMAL BRAIN SCAN: ICD-10-CM

## 2025-07-17 PROBLEM — R06.02 SHORTNESS OF BREATH: Status: ACTIVE | Noted: 2025-07-17

## 2025-07-17 RX ORDER — ALBUTEROL SULFATE AND BUDESONIDE 90; 80 UG/1; UG/1
90-80 AEROSOL, METERED RESPIRATORY (INHALATION) 4 TIMES DAILY
Qty: 1 | Refills: 5 | Status: ACTIVE | COMMUNITY
Start: 2025-07-17 | End: 1900-01-01

## 2025-07-25 ENCOUNTER — TRANSCRIPTION ENCOUNTER (OUTPATIENT)
Age: 55
End: 2025-07-25

## 2025-08-04 ENCOUNTER — APPOINTMENT (OUTPATIENT)
Dept: NEUROLOGY | Facility: CLINIC | Age: 55
End: 2025-08-04